# Patient Record
Sex: MALE | Race: WHITE | NOT HISPANIC OR LATINO | Employment: OTHER | URBAN - NONMETROPOLITAN AREA
[De-identification: names, ages, dates, MRNs, and addresses within clinical notes are randomized per-mention and may not be internally consistent; named-entity substitution may affect disease eponyms.]

---

## 2017-02-09 RX ORDER — MONTELUKAST SODIUM 10 MG/1
TABLET ORAL
Qty: 90 TABLET | Refills: 2 | Status: SHIPPED | OUTPATIENT
Start: 2017-02-09 | End: 2017-11-06 | Stop reason: SDUPTHER

## 2017-02-20 ENCOUNTER — OFFICE VISIT (OUTPATIENT)
Dept: UROLOGY | Facility: CLINIC | Age: 82
End: 2017-02-20

## 2017-02-20 VITALS
DIASTOLIC BLOOD PRESSURE: 74 MMHG | TEMPERATURE: 97.5 F | OXYGEN SATURATION: 94 % | HEART RATE: 70 BPM | BODY MASS INDEX: 19.6 KG/M2 | WEIGHT: 140 LBS | HEIGHT: 71 IN | SYSTOLIC BLOOD PRESSURE: 122 MMHG

## 2017-02-20 DIAGNOSIS — Z85.46 HISTORY OF PROSTATE CANCER: Primary | ICD-10-CM

## 2017-02-20 DIAGNOSIS — C61 MALIGNANT NEOPLASM OF PROSTATE (HCC): ICD-10-CM

## 2017-02-20 DIAGNOSIS — R97.20 ELEVATED PROSTATE SPECIFIC ANTIGEN (PSA): ICD-10-CM

## 2017-02-20 LAB
BILIRUB BLD-MCNC: NEGATIVE MG/DL
CLARITY, POC: CLEAR
COLOR UR: YELLOW
GLUCOSE UR STRIP-MCNC: NEGATIVE MG/DL
KETONES UR QL: NEGATIVE
LEUKOCYTE EST, POC: NEGATIVE
NITRITE UR-MCNC: NEGATIVE MG/ML
PH UR: 6 [PH] (ref 5–8)
PROT UR STRIP-MCNC: NEGATIVE MG/DL
RBC # UR STRIP: NEGATIVE /UL
SP GR UR: 1.01 (ref 1–1.03)
UROBILINOGEN UR QL: NORMAL

## 2017-02-20 PROCEDURE — 81003 URINALYSIS AUTO W/O SCOPE: CPT | Performed by: UROLOGY

## 2017-02-20 PROCEDURE — 99214 OFFICE O/P EST MOD 30 MIN: CPT | Performed by: UROLOGY

## 2017-02-20 NOTE — PROGRESS NOTES
Chief Complaint  Prostate Cancer (PATIENT HAS A HISTORY OF PROSTATE CANCER, RECENT PSA 2.8 )        BENITA Nielson is a 82 y.o. male who is referred for evaluation of his prostate with a past history of cancer and a recent documented rise in his PSA.  He reminds me that I diagnosed him 20 years ago with prostate cancer when his PSA was noted to be 13.  I referred him to Dr. Smith who recommended radiation therapy rather than surgery.  Subsequently his PSAs have been near 0 for years although since Dr. Arenas has been following him it has risen from 1-1/2-2.8 in 12 months.  Currently he denies any difficulty voiding throbbing AUA obstructive index of only 4.  His voided urine today is clear.    Vitals:    02/20/17 1500   BP: 122/74   Pulse: 70   Temp: 97.5 °F (36.4 °C)   SpO2: 94%       Past Medical History  Past Medical History   Diagnosis Date   • Allergic rhinitis    • Arthritis    • Asthma      AS CHILD   • Edema    • Foot drop, right foot    • Hypercholesterolemia    • Hypertension    • Laryngeal cancer      vocal cords   • Localized cancer of vocal cords    • Prostate cancer        Past Surgical History  Past Surgical History   Procedure Laterality Date   • Circumcision       done at 32 years old   • Cardiac catheterization       Dr. aMriusz Blanc at PAC 70% EF  mild to moderate left main disease,mild proximal LAD disease. Medical Therapy recommended   • Hernia repair       Inguinal Sliding- Dr. Negrete   • Prostate surgery        cancer w/ radiation   • Cataract extraction     • Eye surgery Right       tear in the right/Repair Of Retinal Detachment   • Throat surgery       vocal cord polyp removed 2x's  cancer w/ radiation  Dr. Olivares   • Tonsillectomy       With Adenoidectomy       Medications  has a current medication list which includes the following prescription(s): amlodipine, carvedilol, levofloxacin, mometasone, montelukast, and simvastatin.      Allergies  Allergies   Allergen Reactions   • Grass         Social History  Social History     Social History Narrative       Family History  He has no family history of bladder or kidney cancer  He has no family history of kidney stones      AUA Symptom Score:      Review of Systems  Review of Systems  He describes back pain but ROS is negative in all other categories.  Physical Exam  Physical Exam   Constitutional: He is oriented to person, place, and time. He appears well-developed and well-nourished.   HENT:   Head: Normocephalic and atraumatic.   Neck: Normal range of motion.   Pulmonary/Chest: Effort normal. No respiratory distress.   Abdominal: Soft. He exhibits no distension and no mass. There is no tenderness. No hernia. Hernia confirmed negative in the right inguinal area and confirmed negative in the left inguinal area.   Genitourinary: Rectum normal, prostate normal, testes normal and penis normal.   Musculoskeletal: Normal range of motion.   Lymphadenopathy: No inguinal adenopathy noted on the right or left side.   Neurological: He is alert and oriented to person, place, and time.   Skin: Skin is warm and dry.   Psychiatric: He has a normal mood and affect. His behavior is normal.   Vitals reviewed.      Labs Recent and today in the office:  Results for orders placed or performed in visit on 02/20/17   POC Urinalysis Dipstick, Automated   Result Value Ref Range    Color Yellow Yellow, Straw, Dark Yellow, Lidia    Clarity, UA Clear Clear    Glucose, UA Negative Negative, 1000 mg/dL (3+) mg/dL    Bilirubin Negative Negative    Ketones, UA Negative Negative    Specific Gravity  1.015 1.005 - 1.030    Blood, UA Negative Negative    pH, Urine 6.0 5.0 - 8.0    Protein, POC Negative Negative mg/dL    Urobilinogen, UA Normal Normal    Leukocytes Negative Negative    Nitrite, UA Negative Negative         Assessment & Plan  The significance of a rising PSA with a past history of prostate cancer is reviewed in detail with the patient.  Since he is asymptomatic I  suggested close surveillance and if his PSA begins an exponential rise or becomes symptomatic I would recommend androgen suppression.  He has already outlived his father and estimates his longevity at less than 5 years.

## 2017-02-27 RX ORDER — CARVEDILOL 25 MG/1
TABLET ORAL
Qty: 180 TABLET | Refills: 2 | Status: SHIPPED | OUTPATIENT
Start: 2017-02-27 | End: 2017-11-24 | Stop reason: SDUPTHER

## 2017-05-17 ENCOUNTER — LAB (OUTPATIENT)
Dept: UROLOGY | Facility: CLINIC | Age: 82
End: 2017-05-17

## 2017-05-17 DIAGNOSIS — R97.20 ELEVATED PROSTATE SPECIFIC ANTIGEN (PSA): Primary | ICD-10-CM

## 2017-05-17 LAB — PSA SERPL-MCNC: 2.81 NG/ML (ref 0.06–4)

## 2017-05-19 ENCOUNTER — OFFICE VISIT (OUTPATIENT)
Dept: UROLOGY | Facility: CLINIC | Age: 82
End: 2017-05-19

## 2017-05-19 VITALS
TEMPERATURE: 98 F | OXYGEN SATURATION: 97 % | SYSTOLIC BLOOD PRESSURE: 129 MMHG | DIASTOLIC BLOOD PRESSURE: 88 MMHG | HEART RATE: 72 BPM | RESPIRATION RATE: 16 BRPM

## 2017-05-19 DIAGNOSIS — Z85.46 HISTORY OF PROSTATE CANCER: Primary | ICD-10-CM

## 2017-05-19 DIAGNOSIS — C61 MALIGNANT NEOPLASM OF PROSTATE (HCC): ICD-10-CM

## 2017-05-19 LAB
BILIRUB BLD-MCNC: NEGATIVE MG/DL
CLARITY, POC: CLEAR
COLOR UR: YELLOW
GLUCOSE UR STRIP-MCNC: NEGATIVE MG/DL
KETONES UR QL: NEGATIVE
LEUKOCYTE EST, POC: NEGATIVE
NITRITE UR-MCNC: NEGATIVE MG/ML
PH UR: 6 [PH] (ref 5–8)
PROT UR STRIP-MCNC: NEGATIVE MG/DL
RBC # UR STRIP: NEGATIVE /UL
SP GR UR: 1.02 (ref 1–1.03)
UROBILINOGEN UR QL: NORMAL

## 2017-05-19 PROCEDURE — 81003 URINALYSIS AUTO W/O SCOPE: CPT | Performed by: UROLOGY

## 2017-05-19 PROCEDURE — 99213 OFFICE O/P EST LOW 20 MIN: CPT | Performed by: UROLOGY

## 2017-06-16 ENCOUNTER — OFFICE VISIT (OUTPATIENT)
Dept: INTERNAL MEDICINE | Facility: CLINIC | Age: 82
End: 2017-06-16

## 2017-06-16 VITALS
HEART RATE: 63 BPM | HEIGHT: 71 IN | DIASTOLIC BLOOD PRESSURE: 80 MMHG | OXYGEN SATURATION: 94 % | WEIGHT: 145.25 LBS | BODY MASS INDEX: 20.33 KG/M2 | TEMPERATURE: 98.6 F | SYSTOLIC BLOOD PRESSURE: 115 MMHG

## 2017-06-16 DIAGNOSIS — C32.9 LARYNGEAL CANCER (HCC): ICD-10-CM

## 2017-06-16 DIAGNOSIS — I10 ESSENTIAL HYPERTENSION: ICD-10-CM

## 2017-06-16 DIAGNOSIS — C61 MALIGNANT NEOPLASM OF PROSTATE (HCC): ICD-10-CM

## 2017-06-16 DIAGNOSIS — E78.2 MIXED HYPERLIPIDEMIA: Primary | ICD-10-CM

## 2017-06-16 DIAGNOSIS — M21.371 RIGHT FOOT DROP: ICD-10-CM

## 2017-06-16 PROCEDURE — 99397 PER PM REEVAL EST PAT 65+ YR: CPT | Performed by: INTERNAL MEDICINE

## 2017-06-16 PROCEDURE — G0439 PPPS, SUBSEQ VISIT: HCPCS | Performed by: INTERNAL MEDICINE

## 2017-06-16 NOTE — PATIENT INSTRUCTIONS
Medicare Wellness  Personal Prevention Plan of Service     Date of Office Visit:  2017  Encounter Provider:  Vinayak Arenas MD  Place of Service:  [facility]  Patient Name: Shane Nielson  :  1934    As part of the Medicare Wellness portion of your visit today, we are providing you with this personalized preventive plan of services (PPPS). This plan is based upon recommendations of the [place] Preventive Services Task Force (USPSTF) and the Advisory Committee on Immunization Practices (ACIP).    This lists the preventive care services that should be considered, and provides dates of when you are due. Items listed as completed are up-to-date and do not require any further intervention.    Health Maintenance   Topic Date Due   • TDAP/TD VACCINES (1 - Tdap) 1953   • PNEUMOCOCCAL VACCINES (65+ LOW/MEDIUM RISK) (2 of 2 - PPSV23) 2011   • LIPID PANEL  2016   • INFLUENZA VACCINE  2017   • MEDICARE ANNUAL WELLNESS  2018   • ZOSTER VACCINE  Completed       No orders of the defined types were placed in this encounter.      No Follow-up on file.

## 2017-06-16 NOTE — PROGRESS NOTES
QUICK REFERENCE INFORMATION:  The ABCs of the Annual Wellness Visit    Subsequent Medicare Wellness Visit    HEALTH RISK ASSESSMENT    1934    Recent Hospitalizations:  No hospitalization(s) within the last year..        Current Medical Providers:  Patient Care Team:  Vinayak Arenas MD as PCP - General  Shakeel Saavedra MD as Consulting Physician (Cardiology)        Smoking Status:  History   Smoking Status   • Former Smoker   • Quit date: 6/13/1981   Smokeless Tobacco   • Never Used       Alcohol Consumption:  History   Alcohol Use No       Depression Screen:   PHQ-9 Depression Screening 6/16/2017   Little interest or pleasure in doing things 0   Feeling down, depressed, or hopeless 0   PHQ-9 Total Score 0       Health Habits and Functional and Cognitive Screening:  Functional & Cognitive Status 6/16/2017   Do you have difficulty preparing food and eating? No   Do you have difficulty bathing yourself? No   Do you have difficulty getting dressed? No   Do you have difficulty using the toilet? No   Do you have difficulty moving around from place to place? Yes   In the past year have you fallen or experienced a near fall? No   Do you need help using the phone?  No   Are you deaf or do you have serious difficulty hearing?  No   Do you need help with transportation? No   Do you need help shopping? No   Do you need help preparing meals?  No   Do you need help with housework?  No   Do you need help with laundry? No   Do you need help taking your medications? No   Do you need help managing money? No   Do you have difficulty concentrating, remembering or making decisions? No       Health Habits  Current Diet: Well Balanced Diet  Dental Exam: Up to date  Eye Exam: Up to date  Exercise (times per week): 5 times per week  Current Exercise Activities Include: Walking      Does the patient have evidence of cognitive impairment? No    Aspirin use counseling: Does not need ASA (and currently is not on it)      Recent Lab  Results:  CMP:  Lab Results   Component Value Date    BUN 21 12/05/2016    CREATININE 1.10 12/05/2016    EGFRIFNONA 64 12/05/2016    BCR 19.1 12/05/2016     12/05/2016    K 4.7 12/05/2016    CO2 26.0 12/05/2016    CALCIUM 9.4 12/05/2016    ALBUMIN 4.40 12/05/2016    LABIL2 1.6 12/05/2016    BILITOT 1.0 12/05/2016    ALKPHOS 56 12/05/2016    AST 26 12/05/2016    ALT 21 12/05/2016     Lipid Panel:  Lab Results   Component Value Date    TRIG 78 08/15/2014    HDL 38 (L) 08/15/2014    LDLDIRECT 24 12/11/2015     HbA1c:       Visual Acuity:  No exam data present    Age-appropriate Screening Schedule:  Refer to the list below for future screening recommendations based on patient's age, sex and/or medical conditions. Orders for these recommended tests are listed in the plan section. The patient has been provided with a written plan.    Health Maintenance   Topic Date Due   • TDAP/TD VACCINES (1 - Tdap) 04/09/1953   • PNEUMOCOCCAL VACCINES (65+ LOW/MEDIUM RISK) (2 of 2 - PPSV23) 11/08/2011   • LIPID PANEL  12/11/2016   • INFLUENZA VACCINE  08/01/2017   • ZOSTER VACCINE  Completed        Subjective   History of Present Illness    Shane Nielson is a 83 y.o. male who presents for an Subsequent Wellness Visit.    The following portions of the patient's history were reviewed and updated as appropriate: allergies, current medications, past family history, past medical history, past social history, past surgical history and problem list.    Outpatient Medications Prior to Visit   Medication Sig Dispense Refill   • amLODIPine (NORVASC) 5 MG tablet TAKE 1 TABLET DAILY 90 tablet 3   • carvedilol (COREG) 25 MG tablet TAKE 1 TABLET TWICE A  tablet 2   • mometasone (NASONEX) 50 MCG/ACT nasal spray into each nostril.     • montelukast (SINGULAIR) 10 MG tablet TAKE 1 TABLET DAILY 90 tablet 2   • simvastatin (ZOCOR) 40 MG tablet TAKE 1 TABLET DAILY (Patient taking differently: TAKE 1/2 TABLET DAILY) 90 tablet 2   • levoFLOXacin  (LEVAQUIN) 500 MG tablet Take 1 tablet by mouth Daily. 7 tablet 0     No facility-administered medications prior to visit.        Patient Active Problem List   Diagnosis   • Mixed hyperlipidemia   • Essential hypertension   • Malignant neoplasm of prostate   • Coronary arteriosclerosis in native artery   • Arthritis   • Edema       Advance Care Planning:  has an advance directive - a copy HAS NOT been provided    Identification of Risk Factors:  Risk factors include: increased fall risk.    Review of Systems   Constitutional: Negative.  Negative for activity change, appetite change, fatigue and fever.   HENT: Positive for congestion, postnasal drip and rhinorrhea. Negative for ear discharge, ear pain and trouble swallowing.    Eyes: Negative for photophobia and visual disturbance.   Respiratory: Negative for cough and shortness of breath.    Cardiovascular: Negative for chest pain and palpitations.   Gastrointestinal: Negative for abdominal distention, abdominal pain, constipation, diarrhea, nausea and vomiting.   Endocrine: Negative.    Genitourinary: Negative for dysuria, hematuria and urgency.        Nocturia   Musculoskeletal: Positive for arthralgias and gait problem. Negative for back pain, joint swelling and myalgias.   Skin: Negative for color change and rash.   Allergic/Immunologic: Negative.    Neurological: Negative for dizziness, weakness, light-headedness and headaches.        Rt foot drop   Hematological: Negative for adenopathy. Does not bruise/bleed easily.   Psychiatric/Behavioral: Negative for agitation, confusion and dysphoric mood. The patient is not nervous/anxious.        Compared to one year ago, the patient feels his physical health is the same.  Compared to one year ago, the patient feels his mental health is the same.    Objective     Physical Exam   Constitutional: He is oriented to person, place, and time. He appears well-developed and well-nourished. No distress.   HENT:   Nose: Nose  "normal.   Mouth/Throat: Oropharynx is clear and moist.   Eyes: Conjunctivae and EOM are normal. No scleral icterus.   Neck: No tracheal deviation present. No thyromegaly present.   Cardiovascular: Normal rate and regular rhythm.  Exam reveals no friction rub.    No murmur heard.  Pulmonary/Chest: No respiratory distress. He has no wheezes. He has no rales.   Abdominal: Soft. He exhibits no distension and no mass. There is no tenderness. There is no guarding.   Musculoskeletal: Normal range of motion. He exhibits no deformity.   Lymphadenopathy:     He has no cervical adenopathy.   Neurological: He is alert and oriented to person, place, and time. He has normal reflexes. No cranial nerve deficit. Coordination normal.   Rt foot drop   Skin: Skin is warm and dry. No rash noted. No erythema.   Psychiatric: He has a normal mood and affect. His behavior is normal. Judgment and thought content normal.       Vitals:    06/16/17 1033   BP: 115/80   Pulse: 63   Temp: 98.6 °F (37 °C)   SpO2: 94%   Weight: 145 lb 4 oz (65.9 kg)   Height: 71\" (180.3 cm)       Body mass index is 20.26 kg/(m^2).  Discussed the patient's BMI with him. The BMI is in the acceptable range.    Assessment/Plan   Patient Self-Management and Personalized Health Advice  The patient has been provided with information about: fall prevention and preventive services including:   · Fall Risk assessment done, Fall Risk plan of care done.    Visit Diagnoses:    ICD-10-CM ICD-9-CM   1. Mixed hyperlipidemia. Stable on statins. Check lipids with next visit E78.2 272.2   2. Essential hypertension. Stable with current meds I10 401.9   3. Malignant neoplasm of prostate. No sig. Symptoms. psa stable C61 185   4. Right foot drop with brace . Uses cane  M21.371 736.79   5. Laryngeal cancer. Stable follows with ent C32.9 161.9       No orders of the defined types were placed in this encounter.      Outpatient Encounter Prescriptions as of 6/16/2017   Medication Sig " Dispense Refill   • amLODIPine (NORVASC) 5 MG tablet TAKE 1 TABLET DAILY 90 tablet 3   • carvedilol (COREG) 25 MG tablet TAKE 1 TABLET TWICE A  tablet 2   • mometasone (NASONEX) 50 MCG/ACT nasal spray into each nostril.     • montelukast (SINGULAIR) 10 MG tablet TAKE 1 TABLET DAILY 90 tablet 2   • simvastatin (ZOCOR) 40 MG tablet TAKE 1 TABLET DAILY (Patient taking differently: TAKE 1/2 TABLET DAILY) 90 tablet 2   • [DISCONTINUED] levoFLOXacin (LEVAQUIN) 500 MG tablet Take 1 tablet by mouth Daily. 7 tablet 0     No facility-administered encounter medications on file as of 6/16/2017.        Reviewed use of high risk medication in the elderly: yes  Reviewed for potential of harmful drug interactions in the elderly: yes    Follow Up:  No Follow-up on file.     An After Visit Summary and PPPS with all of these plans were given to the patient.

## 2017-06-25 ENCOUNTER — APPOINTMENT (OUTPATIENT)
Dept: GENERAL RADIOLOGY | Facility: HOSPITAL | Age: 82
End: 2017-06-25

## 2017-06-25 ENCOUNTER — HOSPITAL ENCOUNTER (EMERGENCY)
Facility: HOSPITAL | Age: 82
Discharge: HOME | End: 2017-06-25
Attending: EMERGENCY MEDICINE | Admitting: EMERGENCY MEDICINE

## 2017-06-25 VITALS
SYSTOLIC BLOOD PRESSURE: 110 MMHG | HEIGHT: 71 IN | DIASTOLIC BLOOD PRESSURE: 68 MMHG | HEART RATE: 65 BPM | TEMPERATURE: 97.8 F | RESPIRATION RATE: 17 BRPM | WEIGHT: 145 LBS | BODY MASS INDEX: 20.3 KG/M2 | OXYGEN SATURATION: 97 %

## 2017-06-25 DIAGNOSIS — M79.671 RIGHT FOOT PAIN: Primary | ICD-10-CM

## 2017-06-25 PROCEDURE — 73630 X-RAY EXAM OF FOOT: CPT

## 2017-06-25 PROCEDURE — 99284 EMERGENCY DEPT VISIT MOD MDM: CPT

## 2017-06-25 RX ORDER — HYDROCODONE BITARTRATE AND ACETAMINOPHEN 5; 325 MG/1; MG/1
1 TABLET ORAL ONCE
Status: COMPLETED | OUTPATIENT
Start: 2017-06-25 | End: 2017-06-25

## 2017-06-25 RX ADMIN — HYDROCODONE BITARTRATE AND ACETAMINOPHEN 1 TABLET: 5; 325 TABLET ORAL at 10:11

## 2017-06-25 NOTE — DISCHARGE INSTRUCTIONS
You may take Tylenol 650mg every 6-8 hours as well as Aleve 500mg every 12 hours as needed for pain or fever.

## 2017-06-25 NOTE — ED PROVIDER NOTES
Subjective   History of Present Illness  83-year-old male with a right-sided foot drop secondary to L2-L3 compression fracture presenting with right foot pain.  Patient states that he took his legs often had pain on the dorsal surface of the right foot at the MTP of the second toe.  Denies any trauma that he is aware of, swelling, redness, or other specific symptoms.  Sensation is limited in this foot.    Review of Systems   Musculoskeletal: Positive for arthralgias.   All other systems reviewed and are negative.      Past Medical History:   Diagnosis Date   • Allergic rhinitis    • Arthritis    • Asthma     AS CHILD   • Edema    • Foot drop, right foot    • Hypercholesterolemia    • Hypertension    • Laryngeal cancer     vocal cords   • Localized cancer of vocal cords    • Prostate cancer        Allergies   Allergen Reactions   • Grass        Past Surgical History:   Procedure Laterality Date   • CARDIAC CATHETERIZATION      Dr. Mariusz Blanc at PAC 70% EF  mild to moderate left main disease,mild proximal LAD disease. Medical Therapy recommended   • CATARACT EXTRACTION     • CIRCUMCISION      done at 32 years old   • EYE SURGERY Right      tear in the right/Repair Of Retinal Detachment   • HERNIA REPAIR      Inguinal Sliding- Dr. Negrete   • PROSTATE SURGERY       cancer w/ radiation   • THROAT SURGERY      vocal cord polyp removed 2x's  cancer w/ radiation  Dr. Olivares   • TONSILLECTOMY      With Adenoidectomy       Family History   Problem Relation Age of Onset   • Stroke Mother    • Cancer Father        Social History     Social History   • Marital status:      Spouse name: N/A   • Number of children: N/A   • Years of education: N/A     Social History Main Topics   • Smoking status: Former Smoker     Quit date: 6/13/1981   • Smokeless tobacco: Never Used   • Alcohol use No   • Drug use: No   • Sexual activity: Not Asked     Other Topics Concern   • None     Social History Narrative           Objective    Physical Exam   Constitutional: He is oriented to person, place, and time. He appears well-developed and well-nourished. No distress.   Cardiovascular: Normal rate and intact distal pulses.    Pulmonary/Chest: Effort normal. No respiratory distress.   Musculoskeletal: Normal range of motion. He exhibits tenderness (Minimal tenderness palpation of the MTP of the right second toe.). He exhibits no edema or deformity.   Neurological: He is alert and oriented to person, place, and time.   Patient has no strength in the right foot throughout.  States he has some sensation to light touch but not much.   Skin: Skin is warm and dry. No rash noted. He is not diaphoretic. No erythema. No pallor.   Psychiatric: He has a normal mood and affect. His behavior is normal.   Nursing note and vitals reviewed.      Procedures         ED Course  ED Course                  MDM   83-year-old male here with right foot pain.  Afebrile, vital signs stable.  No injury that he remembers and no evidence of injury on exam.  Minimal tenderness to palpation.  No swelling, deformity, break in skin.  Sensation and strength are both limited at baseline so this exam is also limited.  He appears to have a warm, well-perfused foot so.  We'll get an x-ray to evaluate for fracture, give pain control and reassess.    10:50 AM x-ray show no evidence of fracture.  We'll give regulation for Tylenol and Aleve and discharge home with regulation follow-up with his orthopedic surgeon for further management.    Final diagnoses:   Right foot pain            Wero Celis MD  06/25/17 2888

## 2017-08-01 RX ORDER — SIMVASTATIN 40 MG
TABLET ORAL
Qty: 90 TABLET | Refills: 3 | Status: SHIPPED | OUTPATIENT
Start: 2017-08-01 | End: 2018-10-27 | Stop reason: SDUPTHER

## 2017-08-24 ENCOUNTER — OFFICE VISIT (OUTPATIENT)
Dept: CARDIOLOGY | Facility: CLINIC | Age: 82
End: 2017-08-24

## 2017-08-24 VITALS
HEIGHT: 71 IN | HEART RATE: 60 BPM | BODY MASS INDEX: 19.8 KG/M2 | WEIGHT: 141.4 LBS | SYSTOLIC BLOOD PRESSURE: 120 MMHG | DIASTOLIC BLOOD PRESSURE: 60 MMHG

## 2017-08-24 DIAGNOSIS — I25.10 CORONARY ARTERIOSCLEROSIS IN NATIVE ARTERY: Primary | ICD-10-CM

## 2017-08-24 DIAGNOSIS — I10 ESSENTIAL HYPERTENSION: ICD-10-CM

## 2017-08-24 DIAGNOSIS — E78.2 MIXED HYPERLIPIDEMIA: ICD-10-CM

## 2017-08-24 PROCEDURE — 99214 OFFICE O/P EST MOD 30 MIN: CPT | Performed by: INTERNAL MEDICINE

## 2017-08-24 NOTE — PROGRESS NOTES
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 CAD  1. 1999 C nonobst  2. HTN  3. HL   1. 2016 LDL 24  4. 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 XRT 2000  6. R foot drop- brace ([place])  7. Prostate CA XRT ([place])    Allergies  Allergies   Allergen Reactions   • Grass        Current Medications    Current Outpatient Prescriptions:   •  amLODIPine (NORVASC) 5 MG tablet, TAKE 1 TABLET DAILY, Disp: 90 tablet, Rfl: 3  •  carvedilol (COREG) 25 MG tablet, TAKE 1 TABLET TWICE A DAY, Disp: 180 tablet, Rfl: 2  •  mometasone (NASONEX) 50 MCG/ACT nasal spray, into each nostril., Disp: , Rfl:   •  montelukast (SINGULAIR) 10 MG tablet, TAKE 1 TABLET DAILY, Disp: 90 tablet, Rfl: 2  •  simvastatin (ZOCOR) 40 MG tablet, TAKE 1 TABLET DAILY, Disp: 90 tablet, Rfl: 3      History of Present Illness   Pt here for follow up, no new cardiac complaints. Follows with Dr. Arenas regularly for labs. HLD stable. HTN stable. Walks for exercise. Hurt his R foot 2 months ago and the pains resolved with short term ibuprofen.   Pt denies any chest pain, dyspnea, dyspnea on exertion, orthopnea, PND, palpitations, lower extremity edema, or claudication.    ROS:  All systems have been reviewed and are negative with the exception of those mentioned in the HPI.    OBJECTIVE:  Vitals:    08/24/17 0903   BP: 120/60   BP Location: Left arm   Patient Position: Sitting   Pulse: 60   Weight: 141 lb 6.4 oz (64.1 kg)   Height: 71\" (180.3 cm)     Physical Exam   Constitutional: He is oriented to person, place, and time. He appears well-developed and well-nourished. No distress.   Cardiovascular: Normal rate, regular rhythm, normal " heart sounds and intact distal pulses.    No murmur heard.  Pulses:       Radial pulses are 2+ on the right side, and 2+ on the left side.        Dorsalis pedis pulses are 2+ on the right side, and 2+ on the left side.        Posterior tibial pulses are 2+ on the right side, and 2+ on the left side.   Pulmonary/Chest: Effort normal and breath sounds normal. He has no wheezes. He has no rales.   Abdominal: Soft. Bowel sounds are normal. There is no tenderness. There is no guarding.   Musculoskeletal: He exhibits no edema or tenderness.   R foot in brace   Neurological: He is alert and oriented to person, place, and time.   Skin: Skin is warm and dry. No rash noted.   Psychiatric: He has a normal mood and affect.   Nursing note and vitals reviewed.      Diagnostic Data:  Procedures      ASSESSMENT:   Diagnosis Plan   1. Coronary arteriosclerosis in native artery     2. Mixed hyperlipidemia     3. Essential hypertension         PLAN:  1. No anginal equiv, continue medical management  2. Well controlled on low dose simvastatin  3. Well controlled with lwo nl BP, no symptomatic hypotension. Continue current tx and consider decreasing amlodipine if any hypotensive symptoms.     RTC 1 year, sooner if needed.    Vinayak Arenas MD, thank you for referring Mr. Nielson for evaluation.  I have forwarded my electronically generated recommendations to you for review.  Please do not hesitate to call with any questions.    Scribed for Shakeel Saavedra MD by Tatyana Hassan PA-C. 8/24/2017  9:16 AM  I, Shakeel Saavedra MD, personally performed the services described in this documentation as scribed by the above named individual in my presence, and it is both accurate and complete.  8/24/2017  9:59 AM    Shakeel Saavedra MD, FACC

## 2017-11-06 RX ORDER — MONTELUKAST SODIUM 10 MG/1
TABLET ORAL
Qty: 90 TABLET | Refills: 3 | Status: SHIPPED | OUTPATIENT
Start: 2017-11-06 | End: 2018-11-01 | Stop reason: SDUPTHER

## 2017-11-21 ENCOUNTER — LAB (OUTPATIENT)
Dept: UROLOGY | Facility: CLINIC | Age: 82
End: 2017-11-21

## 2017-11-21 DIAGNOSIS — Z85.46 PERSONAL HISTORY OF PROSTATE CANCER: Primary | ICD-10-CM

## 2017-11-21 LAB — PSA SERPL-MCNC: 2.67 NG/ML (ref 0.06–4)

## 2017-11-27 ENCOUNTER — OFFICE VISIT (OUTPATIENT)
Dept: UROLOGY | Facility: CLINIC | Age: 82
End: 2017-11-27

## 2017-11-27 VITALS
SYSTOLIC BLOOD PRESSURE: 120 MMHG | HEART RATE: 76 BPM | BODY MASS INDEX: 19.74 KG/M2 | TEMPERATURE: 97.8 F | OXYGEN SATURATION: 96 % | DIASTOLIC BLOOD PRESSURE: 60 MMHG | HEIGHT: 71 IN | WEIGHT: 141 LBS

## 2017-11-27 DIAGNOSIS — Z85.46 PERSONAL HISTORY OF PROSTATE CANCER: Primary | ICD-10-CM

## 2017-11-27 LAB
BILIRUB BLD-MCNC: NEGATIVE MG/DL
CLARITY, POC: CLEAR
COLOR UR: YELLOW
GLUCOSE UR STRIP-MCNC: NEGATIVE MG/DL
KETONES UR QL: NEGATIVE
LEUKOCYTE EST, POC: NEGATIVE
NITRITE UR-MCNC: NEGATIVE MG/ML
PH UR: 6 [PH] (ref 5–8)
PROT UR STRIP-MCNC: ABNORMAL MG/DL
RBC # UR STRIP: NEGATIVE /UL
SP GR UR: 1.02 (ref 1–1.03)
UROBILINOGEN UR QL: ABNORMAL

## 2017-11-27 PROCEDURE — 99213 OFFICE O/P EST LOW 20 MIN: CPT | Performed by: UROLOGY

## 2017-11-27 PROCEDURE — 81003 URINALYSIS AUTO W/O SCOPE: CPT | Performed by: UROLOGY

## 2017-11-27 RX ORDER — CARVEDILOL 25 MG/1
TABLET ORAL
Qty: 180 TABLET | Refills: 3 | Status: SHIPPED | OUTPATIENT
Start: 2017-11-27 | End: 2018-06-22 | Stop reason: SDUPTHER

## 2017-11-27 NOTE — PROGRESS NOTES
Chief Complaint  Hx of prostate cancer (6 month follow-up)        BENITA Nielson is a 83 y.o. male who returns today for follow-up with a past history of prostate cancer treated years ago with radiation therapy and more recently with a slight rise in his PSA.  Fortunately on the last 3 visits is been very steady at 2.8 or actually less today.  He remains asymptomatic and has a benign digital rectal exam.    Vitals:    11/27/17 0903   BP: 120/60   Pulse: 76   Temp: 97.8 °F (36.6 °C)   SpO2: 96%       Past Medical History  Past Medical History:   Diagnosis Date   • Allergic rhinitis    • Arthritis    • Asthma     AS CHILD   • Edema    • Foot drop, right foot    • Hypercholesterolemia    • Hypertension    • Laryngeal cancer     vocal cords   • Localized cancer of vocal cords    • Prostate cancer        Past Surgical History  Past Surgical History:   Procedure Laterality Date   • CARDIAC CATHETERIZATION      Dr. Mariusz Blanc at [facility] 70% EF  mild to moderate left main disease,mild proximal LAD disease. Medical Therapy recommended   • CATARACT EXTRACTION     • CIRCUMCISION      done at 32 years old   • EYE SURGERY Right      tear in the right/Repair Of Retinal Detachment   • HERNIA REPAIR      Inguinal Sliding- Dr. Negrete   • PROSTATE SURGERY       cancer w/ radiation   • THROAT SURGERY      vocal cord polyp removed 2x's  cancer w/ radiation  Dr. Olivares   • TONSILLECTOMY      With Adenoidectomy       Medications  has a current medication list which includes the following prescription(s): amlodipine, carvedilol, mometasone, montelukast, and simvastatin.      Allergies  Allergies   Allergen Reactions   • Grass        Social History  Social History     Social History Narrative       Family History  He has no family history of bladder or kidney cancer  He has no family history of kidney stones      AUA Symptom Score:      Review of Systems  Review of Systems   Constitutional: Negative.    Genitourinary: Negative.    All other  systems reviewed and are negative.      Physical Exam  Physical Exam    Labs Recent and today in the office:  Results for orders placed or performed in visit on 11/27/17   POC Urinalysis Dipstick, Automated   Result Value Ref Range    Color Yellow Yellow, Straw, Dark Yellow, Lidia    Clarity, UA Clear Clear    Glucose, UA Negative Negative, 1000 mg/dL (3+) mg/dL    Bilirubin Negative Negative    Ketones, UA Negative Negative    Specific Gravity  1.020 1.005 - 1.030    Blood, UA Negative Negative    pH, Urine 6.0 5.0 - 8.0    Protein, POC Trace (A) Negative mg/dL    Urobilinogen, UA 1 E.U./dL  (A) Normal    Leukocytes Negative Negative    Nitrite, UA Negative Negative         Assessment & Plan  History of prostate cancer: With an actual decline in his PSA I think he can return on an annual basis.

## 2017-12-07 RX ORDER — AMLODIPINE BESYLATE 5 MG/1
TABLET ORAL
Qty: 90 TABLET | Refills: 3 | Status: SHIPPED | OUTPATIENT
Start: 2017-12-07 | End: 2018-12-02 | Stop reason: SDUPTHER

## 2017-12-14 ENCOUNTER — OFFICE VISIT (OUTPATIENT)
Dept: INTERNAL MEDICINE | Facility: CLINIC | Age: 82
End: 2017-12-14

## 2017-12-14 VITALS
HEIGHT: 71 IN | TEMPERATURE: 97.3 F | SYSTOLIC BLOOD PRESSURE: 110 MMHG | DIASTOLIC BLOOD PRESSURE: 70 MMHG | OXYGEN SATURATION: 98 % | HEART RATE: 56 BPM | BODY MASS INDEX: 18.34 KG/M2 | WEIGHT: 131 LBS

## 2017-12-14 DIAGNOSIS — C61 MALIGNANT NEOPLASM OF PROSTATE (HCC): ICD-10-CM

## 2017-12-14 DIAGNOSIS — I25.10 CORONARY ARTERIOSCLEROSIS IN NATIVE ARTERY: ICD-10-CM

## 2017-12-14 DIAGNOSIS — I10 ESSENTIAL HYPERTENSION: ICD-10-CM

## 2017-12-14 DIAGNOSIS — M21.371 RIGHT FOOT DROP: Primary | ICD-10-CM

## 2017-12-14 DIAGNOSIS — E78.2 MIXED HYPERLIPIDEMIA: ICD-10-CM

## 2017-12-14 PROCEDURE — 99214 OFFICE O/P EST MOD 30 MIN: CPT | Performed by: INTERNAL MEDICINE

## 2017-12-14 RX ORDER — ACYCLOVIR 50 MG/G
OINTMENT TOPICAL
COMMUNITY
Start: 2017-11-08 | End: 2018-03-26

## 2017-12-14 NOTE — PROGRESS NOTES
"Pernell Nielson is a 83 y.o. male    HPI coming in for follow-up patient with a history of prostate CA and hypertension hyperlipidemia. No new complaints denies chest pain or shortness of breath. No alcohol or tobacco use    The following portions of the patient's history were reviewed and updated as appropriate: allergies, current medications, past family history, past medical history, past social history, past surgical history, and problem list.     Review of Systems   Constitutional: Negative.  Negative for activity change, appetite change, fatigue and fever.   HENT: Positive for congestion, postnasal drip and rhinorrhea. Negative for ear discharge, ear pain and trouble swallowing.    Eyes: Negative for photophobia and visual disturbance.   Respiratory: Negative for cough and shortness of breath.    Cardiovascular: Negative for chest pain and palpitations.   Gastrointestinal: Negative for abdominal distention, abdominal pain, constipation, diarrhea, nausea and vomiting.   Endocrine: Negative.    Genitourinary: Negative for dysuria, hematuria and urgency.        Nocturia   Musculoskeletal: Positive for arthralgias and gait problem. Negative for back pain, joint swelling and myalgias.   Skin: Negative for color change and rash.   Allergic/Immunologic: Negative.    Neurological: Negative for dizziness, weakness, light-headedness and headaches.        Rt foot drop   Hematological: Negative for adenopathy. Does not bruise/bleed easily.   Psychiatric/Behavioral: Negative for agitation, confusion and dysphoric mood. The patient is not nervous/anxious.        Visit Vitals   • /70   • Pulse 56   • Temp 97.3 °F (36.3 °C)   • Ht 180.3 cm (71\")   • Wt 59.4 kg (131 lb)   • SpO2 98%   • BMI 18.27 kg/m2       Objective  Physical Exam   Constitutional: He is oriented to person, place, and time. He appears well-developed and well-nourished. No distress.   HENT:   Nose: Nose normal.   Mouth/Throat: Oropharynx is " clear and moist.   Eyes: Conjunctivae and EOM are normal. No scleral icterus.   Neck: No tracheal deviation present. No thyromegaly present.   Cardiovascular: Normal rate and regular rhythm.  Exam reveals no friction rub.    No murmur heard.  Pulmonary/Chest: No respiratory distress. He has no wheezes. He has no rales.   Abdominal: Soft. He exhibits no distension and no mass. There is no tenderness. There is no guarding.   Musculoskeletal: Normal range of motion. He exhibits no deformity.   Lymphadenopathy:     He has no cervical adenopathy.   Neurological: He is alert and oriented to person, place, and time. He has normal reflexes. No cranial nerve deficit. Coordination normal.   Rt foot drop   Skin: Skin is warm and dry. No rash noted. No erythema.   Psychiatric: He has a normal mood and affect. His behavior is normal. Judgment and thought content normal.       Diagnoses and all orders for this visit:    Right foot drop from severe low back radiculopathy. stable has a splint. No pain    Coronary arteriosclerosis in native artery. Stable angina free    Essential hypertension. Continue with current meds low-salt diet    Mixed hyperlipidemia. Continue with the statins follow lipid profile    Malignant neoplasm of prostate    Other orders  -     acyclovir (ZOVIRAX) 5 % ointment;

## 2018-03-16 ENCOUNTER — APPOINTMENT (OUTPATIENT)
Dept: GENERAL RADIOLOGY | Facility: HOSPITAL | Age: 83
End: 2018-03-16

## 2018-03-16 ENCOUNTER — HOSPITAL ENCOUNTER (EMERGENCY)
Facility: HOSPITAL | Age: 83
Discharge: HOME | End: 2018-03-16
Attending: EMERGENCY MEDICINE | Admitting: EMERGENCY MEDICINE

## 2018-03-16 ENCOUNTER — TELEPHONE (OUTPATIENT)
Dept: INTERNAL MEDICINE | Facility: CLINIC | Age: 83
End: 2018-03-16

## 2018-03-16 VITALS
BODY MASS INDEX: 19.6 KG/M2 | WEIGHT: 140 LBS | TEMPERATURE: 97.5 F | SYSTOLIC BLOOD PRESSURE: 136 MMHG | OXYGEN SATURATION: 97 % | DIASTOLIC BLOOD PRESSURE: 62 MMHG | RESPIRATION RATE: 16 BRPM | HEIGHT: 71 IN | HEART RATE: 60 BPM

## 2018-03-16 VITALS
RESPIRATION RATE: 18 BRPM | BODY MASS INDEX: 19.6 KG/M2 | DIASTOLIC BLOOD PRESSURE: 79 MMHG | HEIGHT: 71 IN | SYSTOLIC BLOOD PRESSURE: 151 MMHG | HEART RATE: 66 BPM | WEIGHT: 140 LBS | OXYGEN SATURATION: 98 % | TEMPERATURE: 98.3 F

## 2018-03-16 DIAGNOSIS — M54.16 LUMBAR RADICULOPATHY: Primary | ICD-10-CM

## 2018-03-16 DIAGNOSIS — M79.671 FOOT PAIN, RIGHT: Primary | ICD-10-CM

## 2018-03-16 LAB
BASOPHILS # BLD AUTO: 0.04 10*3/MM3 (ref 0–0.2)
BASOPHILS NFR BLD AUTO: 0.7 % (ref 0–2.5)
DEPRECATED RDW RBC AUTO: 44.9 FL (ref 37–54)
EOSINOPHIL # BLD AUTO: 0.43 10*3/MM3 (ref 0–0.7)
EOSINOPHIL NFR BLD AUTO: 7.9 % (ref 0–7)
ERYTHROCYTE [DISTWIDTH] IN BLOOD BY AUTOMATED COUNT: 12.7 % (ref 11.5–14.5)
HCT VFR BLD AUTO: 32.8 % (ref 42–52)
HGB BLD-MCNC: 11.3 G/DL (ref 14–18)
IMM GRANULOCYTES # BLD: 0.01 10*3/MM3 (ref 0–0.06)
IMM GRANULOCYTES NFR BLD: 0.2 % (ref 0–0.6)
LYMPHOCYTES # BLD AUTO: 1.06 10*3/MM3 (ref 0.6–3.4)
LYMPHOCYTES NFR BLD AUTO: 19.6 % (ref 10–50)
MCH RBC QN AUTO: 33.1 PG (ref 27–31)
MCHC RBC AUTO-ENTMCNC: 34.5 G/DL (ref 30–37)
MCV RBC AUTO: 96.2 FL (ref 80–94)
MONOCYTES # BLD AUTO: 0.73 10*3/MM3 (ref 0–0.9)
MONOCYTES NFR BLD AUTO: 13.5 % (ref 0–12)
NEUTROPHILS # BLD AUTO: 3.15 10*3/MM3 (ref 2–6.9)
NEUTROPHILS NFR BLD AUTO: 58.1 % (ref 37–80)
NRBC BLD MANUAL-RTO: 0 /100 WBC (ref 0–0)
PLATELET # BLD AUTO: 218 10*3/MM3 (ref 130–400)
PMV BLD AUTO: 9.6 FL (ref 6–12)
RBC # BLD AUTO: 3.41 10*6/MM3 (ref 4.7–6.1)
WBC NRBC COR # BLD: 5.42 10*3/MM3 (ref 4.8–10.8)

## 2018-03-16 PROCEDURE — 99283 EMERGENCY DEPT VISIT LOW MDM: CPT

## 2018-03-16 PROCEDURE — 73630 X-RAY EXAM OF FOOT: CPT

## 2018-03-16 PROCEDURE — 36415 COLL VENOUS BLD VENIPUNCTURE: CPT

## 2018-03-16 PROCEDURE — 85025 COMPLETE CBC W/AUTO DIFF WBC: CPT | Performed by: EMERGENCY MEDICINE

## 2018-03-16 RX ORDER — HYDROCODONE BITARTRATE AND ACETAMINOPHEN 5; 325 MG/1; MG/1
1 TABLET ORAL ONCE
Status: COMPLETED | OUTPATIENT
Start: 2018-03-16 | End: 2018-03-16

## 2018-03-16 RX ORDER — DICLOFENAC SODIUM 75 MG/1
75 TABLET, DELAYED RELEASE ORAL 2 TIMES DAILY
Qty: 20 TABLET | Refills: 0 | Status: SHIPPED | OUTPATIENT
Start: 2018-03-16 | End: 2018-03-26

## 2018-03-16 RX ORDER — HYDROCODONE BITARTRATE AND ACETAMINOPHEN 5; 325 MG/1; MG/1
1 TABLET ORAL EVERY 4 HOURS PRN
Qty: 10 TABLET | Refills: 0 | Status: SHIPPED | OUTPATIENT
Start: 2018-03-16 | End: 2018-03-26

## 2018-03-16 RX ADMIN — HYDROCODONE BITARTRATE AND ACETAMINOPHEN 1 TABLET: 5; 325 TABLET ORAL at 14:28

## 2018-03-16 NOTE — ED PROVIDER NOTES
Subjective   Patient states he awoke with pain in his right foot.  This happened once before the pain is at the base of his fourth toe.  Took some ibuprofen didn't seem to do much.  The pain is improved upon arrival to the ED denies injury redness swelling        History provided by:  Patient and spouse   used: No    Lower Extremity Issue   Location:  Foot  Time since incident:  3 hours  Injury: no    Foot location:  R foot  Pain details:     Quality:  Aching    Radiates to:  Does not radiate    Severity:  Mild    Onset quality:  Sudden    Timing:  Constant    Progression:  Partially resolved  Chronicity:  New  Dislocation: no    Foreign body present:  No foreign bodies  Prior injury to area:  No  Relieved by:  NSAIDs  Worsened by:  Nothing  Associated symptoms: no fever, no swelling and no tingling        Review of Systems   Constitutional: Negative.  Negative for fever.   HENT: Negative.    Respiratory: Negative.    Cardiovascular: Negative.  Negative for chest pain.   Gastrointestinal: Negative.  Negative for abdominal pain.   Endocrine: Negative.    Genitourinary: Negative.  Negative for dysuria.   Skin: Negative.    Neurological: Negative.    Psychiatric/Behavioral: Negative.    All other systems reviewed and are negative.      Past Medical History:   Diagnosis Date   • Allergic rhinitis    • Arthritis    • Asthma     AS CHILD   • Edema    • Foot drop, right foot    • Hypercholesterolemia    • Hypertension    • Laryngeal cancer     vocal cords   • Localized cancer of vocal cords    • Prostate cancer        Allergies   Allergen Reactions   • Grass        Past Surgical History:   Procedure Laterality Date   • CARDIAC CATHETERIZATION      Dr. Mariusz Blanc at PAC 70% EF  mild to moderate left main disease,mild proximal LAD disease. Medical Therapy recommended   • CATARACT EXTRACTION     • CIRCUMCISION      done at 32 years old   • EYE SURGERY Right      tear in the right/Repair Of Retinal  Detachment   • HERNIA REPAIR      Inguinal Sliding- Dr. Negrete   • PROSTATE SURGERY       cancer w/ radiation   • THROAT SURGERY      vocal cord polyp removed 2x's  cancer w/ radiation  Dr. Olivares   • TONSILLECTOMY      With Adenoidectomy       Family History   Problem Relation Age of Onset   • Stroke Mother    • Cancer Father        Social History     Social History   • Marital status:      Social History Main Topics   • Smoking status: Former Smoker     Quit date: 6/13/1981   • Smokeless tobacco: Never Used   • Alcohol use No   • Drug use: No     Other Topics Concern   • Not on file           Objective   Physical Exam   Constitutional: He is oriented to person, place, and time. He appears well-developed and well-nourished. No distress.   HENT:   Head: Normocephalic and atraumatic.   Right Ear: External ear normal.   Left Ear: External ear normal.   Nose: Nose normal.   Eyes: Conjunctivae and EOM are normal. Pupils are equal, round, and reactive to light.   Neck: Normal range of motion. Neck supple. No JVD present. No tracheal deviation present.   Cardiovascular: Normal rate, regular rhythm and normal heart sounds.    No murmur heard.  Pulmonary/Chest: Effort normal and breath sounds normal. No respiratory distress. He has no wheezes.   Abdominal: Soft. Bowel sounds are normal. There is no tenderness.   Musculoskeletal: Normal range of motion. He exhibits no edema or deformity.        Neurological: He is alert and oriented to person, place, and time. No cranial nerve deficit.   Skin: Skin is warm and dry. No rash noted. He is not diaphoretic. No erythema. No pallor.   Psychiatric: He has a normal mood and affect. His behavior is normal. Thought content normal.   Nursing note and vitals reviewed.      Procedures        XR Foot 3+ View Right   ED Interpretation   Negative          ED Course  ED Course      Patient has remained stable throughout his ED course            MDM    Final diagnoses:   Foot pain, right             Raul Bess MD  03/16/18 4333

## 2018-03-16 NOTE — ED PROVIDER NOTES
Subjective   83-year-old male with foot pain.  Seen in emergency department earlier this morning and was given anti-inflammatories after negative x-rays.  He has radiculopathy and worse a brace on the right foot, he has nerve pain but his normal medication is not helping.  The pain is on the top of his foot between his third and fourth toe.  It is a sharp intermittent pain.        History provided by:  Patient   used: No        Review of Systems   Musculoskeletal:        Right foot pain   All other systems reviewed and are negative.      Past Medical History:   Diagnosis Date   • Allergic rhinitis    • Arthritis    • Asthma     AS CHILD   • Edema    • Foot drop, right foot    • Hypercholesterolemia    • Hypertension    • Laryngeal cancer     vocal cords   • Localized cancer of vocal cords    • Prostate cancer        Allergies   Allergen Reactions   • Grass        Past Surgical History:   Procedure Laterality Date   • CARDIAC CATHETERIZATION      Dr. Mariusz Blanc at PAC 70% EF  mild to moderate left main disease,mild proximal LAD disease. Medical Therapy recommended   • CATARACT EXTRACTION     • CIRCUMCISION      done at 32 years old   • EYE SURGERY Right      tear in the right/Repair Of Retinal Detachment   • HERNIA REPAIR      Inguinal Sliding- Dr. Negrete   • PROSTATE SURGERY       cancer w/ radiation   • THROAT SURGERY      vocal cord polyp removed 2x's  cancer w/ radiation  Dr. Olivares   • TONSILLECTOMY      With Adenoidectomy       Family History   Problem Relation Age of Onset   • Stroke Mother    • Cancer Father        Social History     Social History   • Marital status:      Social History Main Topics   • Smoking status: Former Smoker     Quit date: 6/13/1981   • Smokeless tobacco: Never Used   • Alcohol use No   • Drug use: No     Other Topics Concern   • Not on file           Objective   Physical Exam   Constitutional: He is oriented to person, place, and time. He appears  well-developed and well-nourished.   HENT:   Head: Normocephalic and atraumatic.   Left Ear: External ear normal.   Eyes: EOM are normal. Pupils are equal, round, and reactive to light.   Neck: Normal range of motion.   Cardiovascular: Normal rate and regular rhythm.    Pulmonary/Chest: Effort normal and breath sounds normal.   Abdominal: Soft. Bowel sounds are normal.   Musculoskeletal: Normal range of motion. He exhibits no edema or deformity.   palpation of the top of the right foot between the third and fourth toe.   Neurological: He is alert and oriented to person, place, and time.   Skin: Skin is warm and dry.   Psychiatric: He has a normal mood and affect. His behavior is normal. Judgment and thought content normal.   Vitals reviewed.      Procedures         ED Course  ED Course                  MDM    Final diagnoses:   Lumbar radiculopathy            Gee Gómez Jr., PA-BARRON  03/16/18 1501

## 2018-03-16 NOTE — TELEPHONE ENCOUNTER
I SPOKE WITH MELISSA AFTER RECEIVING A VM FROM HIM. HE WAS SEEN AT [facility] ER.  HE HAD SHOOTING PAIN DOWN RIGHT LEG. WAS PRESCRIBED HYDROCODONE TO HELP WITH THE PAIN     GENE SAYS THE PHYSICIAN AT THE ER MENTINED GETTING A SCAN TO SEE IF THE PAIN IS FROM HIS L3/L4 ISSUES.    PATIENT REQUESTED AN APPOINTMENT FOR Monday BUT I REQUESTED HE LET ME TALK TO DR. REEVES ABOUT THIS FIRST THEN CALL HIM WITH DR. REEVES'S RECOMMENDATIONS

## 2018-03-26 ENCOUNTER — OFFICE VISIT (OUTPATIENT)
Dept: INTERNAL MEDICINE | Facility: CLINIC | Age: 83
End: 2018-03-26

## 2018-03-26 VITALS
WEIGHT: 144 LBS | HEIGHT: 71 IN | TEMPERATURE: 97.8 F | DIASTOLIC BLOOD PRESSURE: 70 MMHG | BODY MASS INDEX: 20.16 KG/M2 | SYSTOLIC BLOOD PRESSURE: 140 MMHG | OXYGEN SATURATION: 98 % | HEART RATE: 57 BPM

## 2018-03-26 DIAGNOSIS — G89.29 CHRONIC RIGHT-SIDED LOW BACK PAIN WITH RIGHT-SIDED SCIATICA: Primary | ICD-10-CM

## 2018-03-26 DIAGNOSIS — M54.41 CHRONIC RIGHT-SIDED LOW BACK PAIN WITH RIGHT-SIDED SCIATICA: Primary | ICD-10-CM

## 2018-03-26 PROCEDURE — 99213 OFFICE O/P EST LOW 20 MIN: CPT | Performed by: INTERNAL MEDICINE

## 2018-03-26 RX ORDER — HYDROCODONE BITARTRATE AND ACETAMINOPHEN 5; 325 MG/1; MG/1
1 TABLET ORAL EVERY 8 HOURS PRN
Qty: 10 TABLET | Refills: 0 | Status: SHIPPED | OUTPATIENT
Start: 2018-03-26 | End: 2018-06-22

## 2018-03-26 NOTE — PROGRESS NOTES
"Pernell Nielson is a 83 y.o. male    HPI coming in for evaluation accompanied by his wife was giving us some of the history. Recent ER visit for complains of right foot pain he has had complaints of low back pain with radiculopathy in the past in the ER he underwent a workup with an x-ray of his foot which was unremarkable. He was placed on NSAIDs with some relief. Now appears to be doing significantly better. Denies urinary or bowel symptoms    The following portions of the patient's history were reviewed and updated as appropriate: allergies, current medications, past family history, past medical history, past social history, past surgical history, and problem list.     Review of Systems   Constitutional: Negative.  Negative for activity change, appetite change, fatigue and fever.   HENT: Positive for congestion, postnasal drip and rhinorrhea. Negative for ear discharge, ear pain and trouble swallowing.    Eyes: Negative for photophobia and visual disturbance.   Respiratory: Negative for cough and shortness of breath.    Cardiovascular: Negative for chest pain and palpitations.   Gastrointestinal: Negative for abdominal distention, abdominal pain, constipation, diarrhea, nausea and vomiting.   Endocrine: Negative.    Genitourinary: Negative for dysuria, hematuria and urgency.        Nocturia   Musculoskeletal: Positive for arthralgias and gait problem. Negative for back pain, joint swelling and myalgias.   Skin: Negative for color change and rash.   Allergic/Immunologic: Negative.    Neurological: Negative for dizziness, weakness, light-headedness and headaches.        Rt foot drop   Hematological: Negative for adenopathy. Does not bruise/bleed easily.   Psychiatric/Behavioral: Negative for agitation, confusion and dysphoric mood. The patient is not nervous/anxious.        Visit Vitals  /70   Pulse 57   Temp 97.8 °F (36.6 °C)   Ht 180.3 cm (71\")   Wt 65.3 kg (144 lb)   SpO2 98%   BMI 20.08 kg/m² "       Objective  Physical Exam   Constitutional: He is oriented to person, place, and time. He appears well-developed and well-nourished. No distress.   HENT:   Nose: Nose normal.   Mouth/Throat: Oropharynx is clear and moist.   Eyes: Conjunctivae and EOM are normal. No scleral icterus.   Neck: No tracheal deviation present. No thyromegaly present.   Cardiovascular: Normal rate and regular rhythm.  Exam reveals no friction rub.    No murmur heard.  Pulmonary/Chest: No respiratory distress. He has no wheezes. He has no rales.   Abdominal: Soft. He exhibits no distension and no mass. There is no tenderness. There is no guarding.   Musculoskeletal: Normal range of motion. He exhibits no deformity.   Lymphadenopathy:     He has no cervical adenopathy.   Neurological: He is alert and oriented to person, place, and time. He has normal reflexes. No cranial nerve deficit. Coordination normal.   Rt foot drop   Skin: Skin is warm and dry. No rash noted. No erythema.   Psychiatric: He has a normal mood and affect. His behavior is normal. Judgment and thought content normal.       Diagnoses and all orders for this visit:    Chronic right-sided low back pain with right-sided sciatica continue with home exercises. Currently he is symptom-free hydrocodone when necessary only for exacerbation of symptoms. ER records reviewed patient did not have imaging studies of his LS-spine if he should have recurrence of symptoms may need to do so.

## 2018-05-17 ENCOUNTER — APPOINTMENT (OUTPATIENT)
Dept: CT IMAGING | Facility: HOSPITAL | Age: 83
End: 2018-05-17

## 2018-05-17 ENCOUNTER — HOSPITAL ENCOUNTER (EMERGENCY)
Facility: HOSPITAL | Age: 83
Discharge: HOME | End: 2018-05-17
Attending: EMERGENCY MEDICINE | Admitting: EMERGENCY MEDICINE

## 2018-05-17 VITALS
WEIGHT: 140 LBS | HEIGHT: 71 IN | DIASTOLIC BLOOD PRESSURE: 87 MMHG | HEART RATE: 56 BPM | BODY MASS INDEX: 19.6 KG/M2 | OXYGEN SATURATION: 98 % | TEMPERATURE: 98.2 F | SYSTOLIC BLOOD PRESSURE: 148 MMHG | RESPIRATION RATE: 16 BRPM

## 2018-05-17 DIAGNOSIS — S01.01XA SCALP LACERATION, INITIAL ENCOUNTER: ICD-10-CM

## 2018-05-17 DIAGNOSIS — J01.00 ACUTE MAXILLARY SINUSITIS, RECURRENCE NOT SPECIFIED: ICD-10-CM

## 2018-05-17 DIAGNOSIS — S09.90XA ACUTE HEAD INJURY WITHOUT LOSS OF CONSCIOUSNESS, INITIAL ENCOUNTER: ICD-10-CM
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
## 2018-05-17 PROCEDURE — 99283 EMERGENCY DEPT VISIT LOW MDM: CPT

## 2018-05-17 PROCEDURE — 25010000002 TDAP 5-2.5-18.5 LF-MCG/0.5 SUSPENSION: Performed by: PHYSICIAN ASSISTANT

## 2018-05-17 PROCEDURE — 90715 TDAP VACCINE 7 YRS/> IM: CPT | Performed by: PHYSICIAN ASSISTANT

## 2018-05-17 PROCEDURE — 70450 CT HEAD/BRAIN W/O DYE: CPT

## 2018-05-17 PROCEDURE — 90471 IMMUNIZATION ADMIN: CPT | Performed by: PHYSICIAN ASSISTANT

## 2018-05-17 RX ORDER — AMOXICILLIN 875 MG/1
875 TABLET, COATED ORAL 2 TIMES DAILY
Qty: 28 TABLET | Refills: 0 | Status: SHIPPED | OUTPATIENT
Start: 2018-05-17 | End: 2018-06-22

## 2018-05-17 RX ORDER — LIDOCAINE HYDROCHLORIDE AND EPINEPHRINE 10; 10 MG/ML; UG/ML
10 INJECTION, SOLUTION INFILTRATION; PERINEURAL ONCE
Status: DISCONTINUED | OUTPATIENT
Start: 2018-05-17 | End: 2018-05-17 | Stop reason: HOSPADM

## 2018-05-17 RX ADMIN — TETANUS TOXOID, REDUCED DIPHTHERIA TOXOID AND ACELLULAR PERTUSSIS VACCINE, ADSORBED 0.5 ML: 5; 2.5; 8; 8; 2.5 SUSPENSION INTRAMUSCULAR at 15:14

## 2018-05-17 NOTE — DISCHARGE INSTRUCTIONS
Keep yourr scalp clean with soap and water.  No peroxide or alcohol.  Apply Neosporin twice daily.  Return to the ER for signs of infection at the wound site which include: redness, swelling, puslike drainage, fever, red streaks from the wound.    Also return to the ER for severe headache, vomiting more than twice, confusion, disorientation, trouble walking or trouble talking.    Staples to be removed in 7 days.  Return to the ER for this or have your RN daughter remove them.

## 2018-05-25 ENCOUNTER — OFFICE VISIT (OUTPATIENT)
Dept: INTERNAL MEDICINE | Facility: CLINIC | Age: 83
End: 2018-05-25

## 2018-05-25 VITALS
TEMPERATURE: 98.5 F | SYSTOLIC BLOOD PRESSURE: 140 MMHG | HEIGHT: 71 IN | HEART RATE: 69 BPM | WEIGHT: 141 LBS | OXYGEN SATURATION: 98 % | DIASTOLIC BLOOD PRESSURE: 80 MMHG | BODY MASS INDEX: 19.74 KG/M2

## 2018-05-25 DIAGNOSIS — S01.01XD OCCIPITAL SCALP LACERATION, SUBSEQUENT ENCOUNTER: ICD-10-CM

## 2018-05-25 DIAGNOSIS — J32.0 MAXILLARY SINUSITIS, UNSPECIFIED CHRONICITY: ICD-10-CM

## 2018-05-25 DIAGNOSIS — Z48.02 ENCOUNTER FOR STAPLE REMOVAL: Primary | ICD-10-CM

## 2018-05-25 PROCEDURE — 99214 OFFICE O/P EST MOD 30 MIN: CPT | Performed by: PHYSICIAN ASSISTANT

## 2018-06-22 ENCOUNTER — OFFICE VISIT (OUTPATIENT)
Dept: INTERNAL MEDICINE | Facility: CLINIC | Age: 83
End: 2018-06-22

## 2018-06-22 VITALS
TEMPERATURE: 97.7 F | HEART RATE: 61 BPM | WEIGHT: 139 LBS | OXYGEN SATURATION: 98 % | DIASTOLIC BLOOD PRESSURE: 60 MMHG | HEIGHT: 71 IN | SYSTOLIC BLOOD PRESSURE: 100 MMHG | BODY MASS INDEX: 19.46 KG/M2

## 2018-06-22 DIAGNOSIS — C61 MALIGNANT NEOPLASM OF PROSTATE (HCC): ICD-10-CM

## 2018-06-22 DIAGNOSIS — I10 ESSENTIAL HYPERTENSION: ICD-10-CM

## 2018-06-22 DIAGNOSIS — Z23 NEED FOR VACCINATION: Primary | ICD-10-CM

## 2018-06-22 DIAGNOSIS — I25.10 CORONARY ARTERIOSCLEROSIS IN NATIVE ARTERY: ICD-10-CM

## 2018-06-22 DIAGNOSIS — M21.371 RIGHT FOOT DROP: ICD-10-CM

## 2018-06-22 DIAGNOSIS — C32.9 LARYNGEAL CANCER (HCC): ICD-10-CM

## 2018-06-22 DIAGNOSIS — E78.2 MIXED HYPERLIPIDEMIA: ICD-10-CM

## 2018-06-22 PROCEDURE — G0439 PPPS, SUBSEQ VISIT: HCPCS | Performed by: INTERNAL MEDICINE

## 2018-06-22 PROCEDURE — 90670 PCV13 VACCINE IM: CPT | Performed by: INTERNAL MEDICINE

## 2018-06-22 PROCEDURE — G0009 ADMIN PNEUMOCOCCAL VACCINE: HCPCS | Performed by: INTERNAL MEDICINE

## 2018-06-22 PROCEDURE — 99397 PER PM REEVAL EST PAT 65+ YR: CPT | Performed by: INTERNAL MEDICINE

## 2018-06-22 RX ORDER — CARVEDILOL 12.5 MG/1
25 TABLET ORAL 2 TIMES DAILY
Qty: 180 TABLET | Refills: 3 | Status: SHIPPED | OUTPATIENT
Start: 2018-06-22 | End: 2019-08-06

## 2018-06-22 NOTE — PROGRESS NOTES
QUICK REFERENCE INFORMATION:  The ABCs of the Annual Wellness Visit    Subsequent Medicare Wellness Visit    HEALTH RISK ASSESSMENT  84-year-old male with prostate and laryngeal CA he has, coronary artery disease currently angina free is coming in for wellness visit. He is accompanied by his wife was giving us some of the history  1934    Recent Hospitalizations:  No hospitalization(s) within the last year..        Current Medical Providers:  Patient Care Team:  Vinayak Arenas MD as PCP - General  Shakeel Saavedra MD as Consulting Physician (Cardiology)        Smoking Status:  History   Smoking Status   • Former Smoker   • Quit date: 6/13/1981   Smokeless Tobacco   • Never Used       Alcohol Consumption:  History   Alcohol Use No       Depression Screen:   PHQ-2/PHQ-9 Depression Screening 6/22/2018   Little interest or pleasure in doing things 0   Feeling down, depressed, or hopeless 0   Total Score 0       Health Habits and Functional and Cognitive Screening:  Functional & Cognitive Status 6/22/2018   Do you have difficulty preparing food and eating? No   Do you have difficulty bathing yourself, getting dressed or grooming yourself? No   Do you have difficulty using the toilet? No   Do you have difficulty moving around from place to place? Yes   Do you have trouble with steps or getting out of a bed or a chair? Yes   In the past year have you fallen or experienced a near fall? Yes   Current Diet Well Balanced Diet   Dental Exam Up to date   Eye Exam Up to date   Exercise (times per week) 0 times per week   Current Exercise Activities Include None   Do you need help using the phone?  No   Are you deaf or do you have serious difficulty hearing?  No   Do you need help with transportation? Yes   Do you need help shopping? Yes   Do you need help preparing meals?  No   Do you need help with housework?  No   Do you need help with laundry? No   Do you need help taking your medications? No   Do you need help managing  money? No   Do you ever drive or ride in a car without wearing a seat belt? No   Have you felt unusual stress, anger or loneliness in the last month? Yes   Who do you live with? Spouse   If you need help, do you have trouble finding someone available to you? No   Have you been bothered in the last four weeks by sexual problems? No   Do you have difficulty concentrating, remembering or making decisions? No           Does the patient have evidence of cognitive impairment? No    Aspirin use counseling: Taking ASA appropriately as indicated      Recent Lab Results:  CMP:  Lab Results   Component Value Date    BUN 21 12/05/2016    CREATININE 1.10 12/05/2016    EGFRIFNONA 64 12/05/2016    BCR 19.1 12/05/2016     12/05/2016    K 4.7 12/05/2016    CO2 26.0 12/05/2016    CALCIUM 9.4 12/05/2016    ALBUMIN 4.40 12/05/2016    LABIL2 1.6 12/05/2016    BILITOT 1.0 12/05/2016    ALKPHOS 56 12/05/2016    AST 26 12/05/2016    ALT 21 12/05/2016     Lipid Panel:  Lab Results   Component Value Date    TRIG 78 08/15/2014    HDL 38 (L) 08/15/2014     HbA1c:       Visual Acuity:  No exam data present    Age-appropriate Screening Schedule:  Refer to the list below for future screening recommendations based on patient's age, sex and/or medical conditions. Orders for these recommended tests are listed in the plan section. The patient has been provided with a written plan.    Health Maintenance   Topic Date Due   • PNEUMOCOCCAL VACCINES (65+ LOW/MEDIUM RISK) (2 of 2 - PPSV23) 11/08/2011   • ZOSTER VACCINE (2 of 2) 01/09/2012   • LIPID PANEL  12/11/2016   • INFLUENZA VACCINE  08/01/2018   • TDAP/TD VACCINES (2 - Td) 05/17/2028        Subjective   History of Present Illness    Shane Nielson is a 84 y.o. male who presents for an Subsequent Wellness Visit.    The following portions of the patient's history were reviewed and updated as appropriate: allergies, current medications, past family history, past medical history, past social history,  past surgical history and problem list.    Outpatient Medications Prior to Visit   Medication Sig Dispense Refill   • amLODIPine (NORVASC) 5 MG tablet TAKE 1 TABLET DAILY 90 tablet 3   • aspirin 81 MG tablet Take 1 tablet by mouth Daily. 100 tablet 2   • carvedilol (COREG) 25 MG tablet TAKE 1 TABLET TWICE A  tablet 3   • mometasone (NASONEX) 50 MCG/ACT nasal spray into each nostril.     • montelukast (SINGULAIR) 10 MG tablet TAKE 1 TABLET DAILY 90 tablet 3   • simvastatin (ZOCOR) 40 MG tablet TAKE 1 TABLET DAILY 90 tablet 3   • amoxicillin (AMOXIL) 875 MG tablet Take 1 tablet by mouth 2 (Two) Times a Day. 28 tablet 0   • HYDROcodone-acetaminophen (NORCO) 5-325 MG per tablet Take 1 tablet by mouth Every 8 (Eight) Hours As Needed for Severe Pain . 10 tablet 0     No facility-administered medications prior to visit.        Patient Active Problem List   Diagnosis   • Mixed hyperlipidemia   • Essential hypertension   • Malignant neoplasm of prostate   • Coronary arteriosclerosis in native artery   • Arthritis   • Edema   • Right foot drop   • Laryngeal cancer       Advance Care Planning:  has an advance directive - a copy HAS NOT been provided    Identification of Risk Factors:  Risk factors include: increased fall risk, hearing limitations and polypharmacy.    Review of Systems   Constitutional: Negative.  Negative for activity change, appetite change, fatigue and fever.   HENT: Positive for congestion, postnasal drip and rhinorrhea. Negative for ear discharge, ear pain and trouble swallowing.    Eyes: Negative for photophobia and visual disturbance.   Respiratory: Negative for cough and shortness of breath.    Cardiovascular: Negative for chest pain and palpitations.   Gastrointestinal: Negative for abdominal distention, abdominal pain, constipation, diarrhea, nausea and vomiting.   Endocrine: Negative.    Genitourinary: Negative for dysuria, hematuria and urgency.        Nocturia   Musculoskeletal: Positive for  "arthralgias and gait problem. Negative for back pain, joint swelling and myalgias.   Skin: Negative for color change and rash.   Allergic/Immunologic: Negative.    Neurological: Negative for dizziness, weakness, light-headedness and headaches.        Rt foot drop   Hematological: Negative for adenopathy. Does not bruise/bleed easily.   Psychiatric/Behavioral: Negative for agitation, confusion, dysphoric mood and sleep disturbance. The patient is not nervous/anxious.        Compared to one year ago, the patient feels his physical health is the same.  Compared to one year ago, the patient feels his mental health is the same.    Objective     Physical Exam   Constitutional: He is oriented to person, place, and time. He appears well-developed and well-nourished. No distress.   HENT:   Nose: Nose normal.   Mouth/Throat: Oropharynx is clear and moist.   Eyes: Conjunctivae and EOM are normal. No scleral icterus.   Neck: No tracheal deviation present. No thyromegaly present.   Cardiovascular: Normal rate and regular rhythm.  Exam reveals no friction rub.    No murmur heard.  Pulmonary/Chest: No respiratory distress. He has no wheezes. He has no rales.   Abdominal: Soft. He exhibits no distension and no mass. There is no tenderness. There is no guarding.   Musculoskeletal: Normal range of motion. He exhibits no deformity.   Lymphadenopathy:     He has no cervical adenopathy.   Neurological: He is alert and oriented to person, place, and time. He has normal reflexes. No cranial nerve deficit. Coordination normal.   Rt foot drop   Skin: Skin is warm and dry. No rash noted. No erythema.   Psychiatric: He has a normal mood and affect. His behavior is normal. Judgment and thought content normal.       Vitals:    06/22/18 0902   BP: 100/60   Pulse: 61   Temp: 97.7 °F (36.5 °C)   SpO2: 98%   Weight: 63 kg (139 lb)   Height: 180.3 cm (70.98\")       Patient's Body mass index is 19.4 kg/m². BMI is below normal parameters. " Recommendations include: discussed diet.      Assessment/Plan   Patient Self-Management and Personalized Health Advice  The patient has been provided with information about: fall prevention and preventive services including:   · Fall Risk assessment done, Fall Risk plan of care done, Nutrition counseling provided, Urinary Incontinence assessment done.    Visit Diagnoses:    ICD-10-CM ICD-9-CM   1. Need for vaccination Z23 V05.9   2. Laryngeal cancerStable symptom-free  C32.9 161.9   3. Malignant neoplasm of prostateStable recent PSA showed stable numbers he is following up with urology   C61 185   4. Coronary arteriosclerosis in native artery. Stable angina free  I25.10 414.01   5. Essential hypertension. Continue with the dietary restrictions and antihypertensive meds  I10 401.9   6. Mixed hyperlipidemia. Stable  E78.2 272.2   7. Right foot drop. Continue with orthotics  M21.371 736.79       Orders Placed This Encounter   Procedures   • Pneumococcal Conjugate Vaccine 13-Valent All       Outpatient Encounter Prescriptions as of 6/22/2018   Medication Sig Dispense Refill   • amLODIPine (NORVASC) 5 MG tablet TAKE 1 TABLET DAILY 90 tablet 3   • aspirin 81 MG tablet Take 1 tablet by mouth Daily. 100 tablet 2   • carvedilol (COREG) 25 MG tablet TAKE 1 TABLET TWICE A  tablet 3   • mometasone (NASONEX) 50 MCG/ACT nasal spray into each nostril.     • montelukast (SINGULAIR) 10 MG tablet TAKE 1 TABLET DAILY 90 tablet 3   • simvastatin (ZOCOR) 40 MG tablet TAKE 1 TABLET DAILY 90 tablet 3   • [DISCONTINUED] amoxicillin (AMOXIL) 875 MG tablet Take 1 tablet by mouth 2 (Two) Times a Day. 28 tablet 0   • [DISCONTINUED] HYDROcodone-acetaminophen (NORCO) 5-325 MG per tablet Take 1 tablet by mouth Every 8 (Eight) Hours As Needed for Severe Pain . 10 tablet 0     No facility-administered encounter medications on file as of 6/22/2018.        Reviewed use of high risk medication in the elderly: yes  Reviewed for potential of  harmful drug interactions in the elderly: yes    Follow Up:  No Follow-up on file.     An After Visit Summary and PPPS with all of these plans were given to the patient.

## 2018-06-22 NOTE — PATIENT INSTRUCTIONS
Medicare Wellness  Personal Prevention Plan of Service     Date of Office Visit:  2018  Encounter Provider:  Vinayak Arenas MD  Place of Service:  [facility]  Patient Name: Shane Nielson  :  1934    As part of the Medicare Wellness portion of your visit today, we are providing you with this personalized preventive plan of services (PPPS). This plan is based upon recommendations of the [place] Preventive Services Task Force (USPSTF) and the Advisory Committee on Immunization Practices (ACIP).    This lists the preventive care services that should be considered, and provides dates of when you are due. Items listed as completed are up-to-date and do not require any further intervention.    Health Maintenance   Topic Date Due   • PNEUMOCOCCAL VACCINES (65+ LOW/MEDIUM RISK) (2 of 2 - PPSV23) 2011   • ZOSTER VACCINE (2 of 2) 2012   • LIPID PANEL  2016   • MEDICARE ANNUAL WELLNESS  2018   • INFLUENZA VACCINE  2018   • TDAP/TD VACCINES (2 - Td) 2028       Orders Placed This Encounter   Procedures   • Pneumococcal Conjugate Vaccine 13-Valent All       No Follow-up on file.

## 2018-08-27 ENCOUNTER — OFFICE VISIT (OUTPATIENT)
Dept: CARDIOLOGY | Facility: CLINIC | Age: 83
End: 2018-08-27

## 2018-08-27 VITALS
DIASTOLIC BLOOD PRESSURE: 60 MMHG | SYSTOLIC BLOOD PRESSURE: 118 MMHG | WEIGHT: 150 LBS | HEART RATE: 52 BPM | HEIGHT: 70 IN | BODY MASS INDEX: 21.47 KG/M2

## 2018-08-27 DIAGNOSIS — I10 ESSENTIAL HYPERTENSION: ICD-10-CM

## 2018-08-27 DIAGNOSIS — I25.10 CORONARY ARTERY DISEASE INVOLVING NATIVE CORONARY ARTERY OF NATIVE HEART WITHOUT ANGINA PECTORIS: Primary | ICD-10-CM

## 2018-08-27 DIAGNOSIS — E78.2 MIXED HYPERLIPIDEMIA: ICD-10-CM

## 2018-08-27 PROCEDURE — 99213 OFFICE O/P EST LOW 20 MIN: CPT | Performed by: INTERNAL MEDICINE

## 2018-08-27 NOTE — PROGRESS NOTES
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 CAD  1. 1999 LHC nonobst  2. HTN  3. HL   1. 2016 LDL 24  4. 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 XRT 2000  6. R foot drop- brace ([place])  7. Prostate CA XRT ([place])    Allergies  Allergies   Allergen Reactions   • Grass        Current Medications    Current Outpatient Prescriptions:   •  amLODIPine (NORVASC) 5 MG tablet, TAKE 1 TABLET DAILY, Disp: 90 tablet, Rfl: 3  •  aspirin 81 MG tablet, Take 1 tablet by mouth Daily., Disp: 100 tablet, Rfl: 2  •  carvedilol (COREG) 12.5 MG tablet, Take 2 tablets by mouth 2 (Two) Times a Day., Disp: 180 tablet, Rfl: 3  •  mometasone (NASONEX) 50 MCG/ACT nasal spray, into each nostril., Disp: , Rfl:   •  montelukast (SINGULAIR) 10 MG tablet, TAKE 1 TABLET DAILY, Disp: 90 tablet, Rfl: 3  •  simvastatin (ZOCOR) 40 MG tablet, TAKE 1 TABLET DAILY, Disp: 90 tablet, Rfl: 3      History of Present Illness   Pt here for follow up, no new cardiac complaints. Follows with Dr. Arenas regularly for labs. HLD stable. HTN stable. Walks for exercise as tolerant w cane. Pt denies any chest pain, dyspnea, dyspnea on exertion, orthopnea, PND, palpitations, lower extremity edema, or claudication.    ROS:  All systems have been reviewed and are negative with the exception of those mentioned in the HPI.    OBJECTIVE:  Vitals:    08/27/18 0939   BP: 118/60   BP Location: Right arm   Patient Position: Sitting   Pulse: 52   Weight: 68 kg (150 lb)   Height: 177.8 cm (70\")     Physical Exam   Constitutional: He is oriented to person, place, and time. He appears well-developed and well-nourished. No distress.   Cardiovascular: Normal rate, " regular rhythm, normal heart sounds and intact distal pulses.    No murmur heard.  Pulses:       Radial pulses are 2+ on the right side, and 2+ on the left side.        Dorsalis pedis pulses are 2+ on the right side, and 2+ on the left side.        Posterior tibial pulses are 2+ on the right side, and 2+ on the left side.   Pulmonary/Chest: Effort normal and breath sounds normal. He has no wheezes. He has no rales.   Abdominal: Soft. Bowel sounds are normal. There is no tenderness. There is no guarding.   Musculoskeletal: He exhibits no edema or tenderness.   R foot in brace   Neurological: He is alert and oriented to person, place, and time.   Skin: Skin is warm and dry. No rash noted.   Psychiatric: He has a normal mood and affect.   Nursing note and vitals reviewed.      Diagnostic Data:  Procedures      ASSESSMENT:   Diagnosis Plan   1. Coronary artery disease involving native coronary artery of native heart without angina pectoris     2. Mixed hyperlipidemia     3. Essential hypertension         PLAN:  1. No anginal equiv, continue medical management  2. Well controlled on low dose simvastatin  3. Well controlled with lwo nl BP, no symptomatic hypotension. Continue current tx and consider decreasing amlodipine if any hypotensive symptoms. Further needs fu re anemia    RTC 1 year, sooner if needed.      8/27/2018  11:11 AM    Shkaeel Saavedra MD, FACC

## 2018-10-29 RX ORDER — SIMVASTATIN 40 MG
TABLET ORAL
Qty: 90 TABLET | Refills: 3 | Status: SHIPPED | OUTPATIENT
Start: 2018-10-29 | End: 2019-02-01 | Stop reason: SDUPTHER

## 2018-11-01 RX ORDER — MONTELUKAST SODIUM 10 MG/1
TABLET ORAL
Qty: 90 TABLET | Refills: 3 | Status: SHIPPED | OUTPATIENT
Start: 2018-11-01 | End: 2019-08-14

## 2018-11-27 ENCOUNTER — OFFICE VISIT (OUTPATIENT)
Dept: UROLOGY | Facility: CLINIC | Age: 83
End: 2018-11-27

## 2018-11-27 DIAGNOSIS — Z85.46 PERSONAL HISTORY OF PROSTATE CANCER: Primary | ICD-10-CM

## 2018-11-27 PROCEDURE — 99213 OFFICE O/P EST LOW 20 MIN: CPT | Performed by: UROLOGY

## 2018-11-27 PROCEDURE — 81003 URINALYSIS AUTO W/O SCOPE: CPT | Performed by: UROLOGY

## 2018-11-27 NOTE — PROGRESS NOTES
Chief Complaint  Personal history of prostate cancer        BENITA Nielson is a 84 y.o. male who returns today for annual checkup with a distant history of prostate cancer treated with external beam radiation therapy with good effect.  He's had a PSA was normal for 20 years subsequently.  He describes an AUA symptom index today of only 2 and declines my offer of digital rectal exam and PSA today.    He states he tripped over his rocker and cut his head but his had otherwise an uneventful year.    There were no vitals filed for this visit.    Past Medical History  Past Medical History:   Diagnosis Date   • Allergic rhinitis    • Arthritis    • Asthma     AS CHILD   • Edema    • Foot drop, right foot    • Hypercholesterolemia    • Hypertension    • Laryngeal cancer (CMS/HCC)     vocal cords   • Prostate cancer (CMS/HCC)        Past Surgical History  Past Surgical History:   Procedure Laterality Date   • CARDIAC CATHETERIZATION      Dr. Mariusz Blanc at [facility] 70% EF  mild to moderate left main disease,mild proximal LAD disease. Medical Therapy recommended   • CATARACT EXTRACTION     • CIRCUMCISION      done at 32 years old   • EYE SURGERY Right      tear in the right/Repair Of Retinal Detachment   • HERNIA REPAIR      Inguinal Sliding- Dr. Negrete   • PROSTATE SURGERY       cancer w/ radiation   • THROAT SURGERY      vocal cord polyp removed 2x's  cancer w/ radiation  Dr. Olivares   • TONSILLECTOMY      With Adenoidectomy       Medications  has a current medication list which includes the following prescription(s): amlodipine, aspirin, carvedilol, mometasone, montelukast, and simvastatin.      Allergies  Allergies   Allergen Reactions   • Grass        Social History  Social History     Social History Narrative   • Not on file       Family History  He has no family history of bladder or kidney cancer  He has no family history of kidney stones      AUA Symptom Score:      Review of Systems  Review of Systems   Constitutional:  Negative.    Genitourinary: Negative.    All other systems reviewed and are negative.      Physical Exam  Physical Exam    Labs Recent and today in the office:  Results for orders placed or performed in visit on 11/27/18   POC Urinalysis Dipstick, Automated   Result Value Ref Range    Color Yellow Yellow, Straw, Dark Yellow, Lidia    Clarity, UA Clear Clear    Specific Gravity  1.025 1.005 - 1.030    pH, Urine 6.0 5.0 - 8.0    Leukocytes Negative Negative    Nitrite, UA Negative Negative    Protein, POC Negative Negative mg/dL    Glucose, UA Negative Negative, 1000 mg/dL (3+) mg/dL    Ketones, UA Negative Negative    Urobilinogen, UA Normal Normal    Bilirubin Negative Negative    Blood, UA Negative Negative         Assessment & Plan  #1 prostate cancer: In remission 20 years after radiation therapy.  He return on an annual basis and when necessary. if he ever becomes symptomatic I would recommend hormonal suppression.

## 2018-12-03 RX ORDER — AMLODIPINE BESYLATE 5 MG/1
TABLET ORAL
Qty: 90 TABLET | Refills: 3 | Status: SHIPPED | OUTPATIENT
Start: 2018-12-03 | End: 2019-11-30 | Stop reason: SDUPTHER

## 2019-02-01 ENCOUNTER — OFFICE VISIT (OUTPATIENT)
Dept: INTERNAL MEDICINE | Facility: CLINIC | Age: 84
End: 2019-02-01

## 2019-02-01 VITALS
HEART RATE: 57 BPM | DIASTOLIC BLOOD PRESSURE: 66 MMHG | TEMPERATURE: 97.7 F | OXYGEN SATURATION: 98 % | SYSTOLIC BLOOD PRESSURE: 134 MMHG | HEIGHT: 71 IN | BODY MASS INDEX: 19.6 KG/M2 | WEIGHT: 140 LBS

## 2019-02-01 DIAGNOSIS — C32.9 LARYNGEAL CANCER (HCC): ICD-10-CM

## 2019-02-01 DIAGNOSIS — C61 MALIGNANT NEOPLASM OF PROSTATE (HCC): Primary | ICD-10-CM

## 2019-02-01 DIAGNOSIS — I10 ESSENTIAL HYPERTENSION: ICD-10-CM

## 2019-02-01 DIAGNOSIS — E78.2 MIXED HYPERLIPIDEMIA: ICD-10-CM

## 2019-02-01 PROCEDURE — 99214 OFFICE O/P EST MOD 30 MIN: CPT | Performed by: INTERNAL MEDICINE

## 2019-02-01 RX ORDER — SIMVASTATIN 20 MG
40 TABLET ORAL DAILY
Qty: 90 TABLET | Refills: 3 | Status: SHIPPED | OUTPATIENT
Start: 2019-02-01 | End: 2019-02-21

## 2019-02-01 NOTE — PROGRESS NOTES
"Pernell Nielson is a 84 y.o. male    HPI coming in for follow-up is accompanied by his wife was giving us some of the history. Patient with prostate CA and laryngeal CA both currently under remission. He has hypertension and dyslipidemia. No alcohol or tobacco use    The following portions of the patient's history were reviewed and updated as appropriate: allergies, current medications, past family history, past medical history, past social history, past surgical history, and problem list.     Review of Systems   Constitutional: Negative.  Negative for activity change, appetite change, fatigue and fever.   HENT: Negative for congestion, ear discharge, ear pain and trouble swallowing.    Eyes: Negative for photophobia and visual disturbance.   Respiratory: Negative for cough and shortness of breath.    Cardiovascular: Negative for chest pain and palpitations.   Gastrointestinal: Negative for abdominal distention, abdominal pain, constipation, diarrhea, nausea and vomiting.   Endocrine: Negative.    Genitourinary: Negative for dysuria, hematuria and urgency.   Musculoskeletal: Positive for arthralgias, back pain and gait problem. Negative for joint swelling and myalgias.   Skin: Negative for color change and rash.   Allergic/Immunologic: Negative.    Neurological: Negative for dizziness, weakness, light-headedness and headaches.   Hematological: Negative for adenopathy. Does not bruise/bleed easily.   Psychiatric/Behavioral: Negative for agitation, confusion and dysphoric mood. The patient is not nervous/anxious.        Visit Vitals  /66 Comment: AUTOMATIC   Pulse 57   Temp 97.7 °F (36.5 °C) (Temporal)   Ht 180.3 cm (70.98\")   Wt 63.5 kg (140 lb)   SpO2 98%   BMI 19.54 kg/m²       Objective  Physical Exam   Constitutional: He is oriented to person, place, and time. He appears well-developed and well-nourished. No distress.   HENT:   Nose: Nose normal.   Mouth/Throat: Oropharynx is clear and moist. "   Eyes: Conjunctivae and EOM are normal. No scleral icterus.   Neck: No tracheal deviation present. No thyromegaly present.   Cardiovascular: Normal rate and regular rhythm. Exam reveals no friction rub.   No murmur heard.  Pulmonary/Chest: No respiratory distress. He has no wheezes. He has no rales.   Abdominal: Soft. He exhibits no distension and no mass. There is no tenderness. There is no guarding.   Musculoskeletal: Normal range of motion. He exhibits deformity.   Lymphadenopathy:     He has no cervical adenopathy.   Neurological: He is alert and oriented to person, place, and time. He has normal reflexes. No cranial nerve deficit. Coordination normal.   Skin: Skin is warm and dry. No rash noted. No erythema.   Psychiatric: He has a normal mood and affect. His behavior is normal. Judgment and thought content normal.       Diagnoses and all orders for this visit:    Malignant neoplasm of prostate (CMS/HCC) stable has been following up with urology. Does not have significant urinary symptoms    Laryngeal cancer (CMS/HCC) stable symptom-free    Essential hypertension stable with current meds and low-salt diet    Other orders  -     aspirin 81 MG tablet; Take 1 tablet by mouth Daily.  -     simvastatin (ZOCOR) 20 MG tablet; Take 2 tablets by mouth Daily for 20 days.

## 2019-02-02 LAB
ALBUMIN SERPL-MCNC: 4.4 G/DL (ref 3.5–5)
ALBUMIN/GLOB SERPL: 1.4 G/DL (ref 1–2)
ALP SERPL-CCNC: 52 U/L (ref 38–126)
ALT SERPL-CCNC: 21 U/L (ref 13–69)
AST SERPL-CCNC: 22 U/L (ref 15–46)
BILIRUB SERPL-MCNC: 0.6 MG/DL (ref 0.2–1.3)
BUN SERPL-MCNC: 16 MG/DL (ref 7–20)
BUN/CREAT SERPL: 14.5 (ref 6.3–21.9)
CALCIUM SERPL-MCNC: 9.3 MG/DL (ref 8.4–10.2)
CHLORIDE SERPL-SCNC: 101 MMOL/L (ref 98–107)
CO2 SERPL-SCNC: 26 MMOL/L (ref 26–30)
CREAT SERPL-MCNC: 1.1 MG/DL (ref 0.6–1.3)
GLOBULIN SER CALC-MCNC: 3.1 GM/DL
GLUCOSE SERPL-MCNC: 82 MG/DL (ref 74–98)
LDLC SERPL DIRECT ASSAY-MCNC: 68 MG/DL (ref 0–99)
POTASSIUM SERPL-SCNC: 4.7 MMOL/L (ref 3.5–5.1)
PROT SERPL-MCNC: 7.5 G/DL (ref 6.3–8.2)
SODIUM SERPL-SCNC: 135 MMOL/L (ref 137–145)

## 2019-02-08 ENCOUNTER — APPOINTMENT (OUTPATIENT)
Dept: CT IMAGING | Facility: HOSPITAL | Age: 84
End: 2019-02-08

## 2019-02-08 ENCOUNTER — HOSPITAL ENCOUNTER (EMERGENCY)
Facility: HOSPITAL | Age: 84
Discharge: HOME | End: 2019-02-08
Attending: EMERGENCY MEDICINE | Admitting: EMERGENCY MEDICINE

## 2019-02-08 VITALS
WEIGHT: 140 LBS | HEART RATE: 67 BPM | OXYGEN SATURATION: 96 % | BODY MASS INDEX: 19.6 KG/M2 | SYSTOLIC BLOOD PRESSURE: 160 MMHG | RESPIRATION RATE: 18 BRPM | DIASTOLIC BLOOD PRESSURE: 74 MMHG | TEMPERATURE: 98.4 F | HEIGHT: 71 IN

## 2019-02-08 DIAGNOSIS — S01.01XA LACERATION OF SCALP, INITIAL ENCOUNTER: Primary | ICD-10-CM

## 2019-02-08 DIAGNOSIS — S09.90XA ACUTE HEAD INJURY WITHOUT LOSS OF CONSCIOUSNESS, INITIAL ENCOUNTER: ICD-10-CM

## 2019-02-08 PROCEDURE — 70450 CT HEAD/BRAIN W/O DYE: CPT

## 2019-02-08 PROCEDURE — 99282 EMERGENCY DEPT VISIT SF MDM: CPT

## 2019-02-08 RX ORDER — LIDOCAINE HYDROCHLORIDE AND EPINEPHRINE 10; 10 MG/ML; UG/ML
10 INJECTION, SOLUTION INFILTRATION; PERINEURAL ONCE
Status: COMPLETED | OUTPATIENT
Start: 2019-02-08 | End: 2019-02-08

## 2019-02-08 RX ADMIN — LIDOCAINE HYDROCHLORIDE AND EPINEPHRINE 10 ML: 10; 10 INJECTION, SOLUTION INFILTRATION; PERINEURAL at 14:07

## 2019-02-08 NOTE — ED PROVIDER NOTES
Subjective   Pt states was getting up off toilet using rolling walker to assist himself up however wheels weren't locked and pt fell as a result and struck right posterior scalp on wooden cabinet. No LOC, is on daily asa. No visual changes, no vomiting/nausea. Normal mentation at this time. No neck pain, specifically no cervical vertebral tenderness and no other complaints.             Review of Systems   Skin:        2in vertical laceration to right posterior scalp     All other systems reviewed and are negative.      Past Medical History:   Diagnosis Date   • Allergic rhinitis    • Arthritis    • Asthma     AS CHILD   • Edema    • Foot drop, right foot    • Hypercholesterolemia    • Hypertension    • Laryngeal cancer (CMS/HCC)     vocal cords   • Prostate cancer (CMS/HCC)        Allergies   Allergen Reactions   • Grass        Past Surgical History:   Procedure Laterality Date   • CARDIAC CATHETERIZATION      Dr. Mariusz Blanc at [facility] 70% EF  mild to moderate left main disease,mild proximal LAD disease. Medical Therapy recommended   • CATARACT EXTRACTION     • CIRCUMCISION      done at 32 years old   • EYE SURGERY Right      tear in the right/Repair Of Retinal Detachment   • HERNIA REPAIR      Inguinal Sliding- Dr. Negrete   • PROSTATE SURGERY       cancer w/ radiation   • THROAT SURGERY      vocal cord polyp removed 2x's  cancer w/ radiation  Dr. Olivares   • TONSILLECTOMY      With Adenoidectomy       Family History   Problem Relation Age of Onset   • Stroke Mother    • Cancer Father        Social History     Socioeconomic History   • Marital status:      Spouse name: Not on file   • Number of children: Not on file   • Years of education: Not on file   • Highest education level: Not on file   Tobacco Use   • Smoking status: Former Smoker     Last attempt to quit: 1981     Years since quittin.6   • Smokeless tobacco: Never Used   Substance and Sexual Activity   • Alcohol use: No   • Drug use: No            Objective   Physical Exam   Constitutional: He is oriented to person, place, and time. He appears well-developed and well-nourished. No distress.   HENT:   Head: Head is without raccoon's eyes and without Dalal's sign.   Right Ear: External ear normal.   Left Ear: External ear normal.   Neck: Normal range of motion. No spinous process tenderness present.   Cardiovascular: Normal rate.   Pulmonary/Chest: Effort normal.   Musculoskeletal: Normal range of motion.   Neurological: He is alert and oriented to person, place, and time.   Skin: He is not diaphoretic.   2in vertical laceration to right posterior scalp.   Bleeding controlled at this time without intervention.   Psychiatric: He has a normal mood and affect. His behavior is normal. Judgment and thought content normal.       Laceration Repair  Date/Time: 2/8/2019 2:18 PM  Performed by: Valdemar Vila PA-C  Authorized by: Odalis Becerra MD     Consent:     Consent obtained:  Verbal    Consent given by:  Patient    Risks discussed:  Poor cosmetic result  Anesthesia (see MAR for exact dosages):     Anesthesia method:  Local infiltration    Local anesthetic:  Lidocaine 1% WITH epi  Laceration details:     Location:  Scalp    Scalp location:  Occipital    Length (cm):  2.5  Repair type:     Repair type:  Simple  Exploration:     Hemostasis achieved with:  Direct pressure    Contaminated: no    Treatment:     Area cleansed with:  Hibiclens    Amount of cleaning:  Standard    Irrigation solution:  Sterile saline    Visualized foreign bodies/material removed: no    Skin repair:     Repair method:  Staples    Number of staples:  6  Approximation:     Approximation:  Close    Vermilion border: well-aligned    Post-procedure details:     Dressing:  Open (no dressing)    Patient tolerance of procedure:  Tolerated well, no immediate complications               ED Course                  MDM      Final diagnoses:   Laceration of scalp, initial  encounter   Acute head injury without loss of consciousness, initial encounter            Valdemar Vila PA-C  02/08/19 3077

## 2019-02-08 NOTE — ED NOTES
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 Patient said he would follow up with Dr. Arenas to have miracle removed.     Tonja Woodall RN  02/08/19 3103

## 2019-02-12 ENCOUNTER — TELEPHONE (OUTPATIENT)
Dept: INTERNAL MEDICINE | Facility: CLINIC | Age: 84
End: 2019-02-12

## 2019-02-12 NOTE — TELEPHONE ENCOUNTER
Patient had staples put in in the ER and needs them removed next Monday. How does this need to be put on the schedule and who would do this?     pts daughter, Pearl Stern, is trying to get this scheduled for pt.  Phone # [phone]

## 2019-02-18 ENCOUNTER — PROCEDURE VISIT (OUTPATIENT)
Dept: INTERNAL MEDICINE | Facility: CLINIC | Age: 84
End: 2019-02-18

## 2019-02-18 VITALS
SYSTOLIC BLOOD PRESSURE: 128 MMHG | OXYGEN SATURATION: 98 % | HEIGHT: 71 IN | TEMPERATURE: 97.5 F | BODY MASS INDEX: 19.88 KG/M2 | DIASTOLIC BLOOD PRESSURE: 69 MMHG | WEIGHT: 142 LBS | HEART RATE: 60 BPM

## 2019-02-18 DIAGNOSIS — S01.01XA LACERATION OF SCALP, INITIAL ENCOUNTER: Primary | ICD-10-CM

## 2019-02-18 NOTE — PROGRESS NOTES
"Pernell Nielson is a 84 y.o. male    HPI coming in for follow-up for evaluation of her scalp own after an accidental fall about 10 days ago. Was evaluated in the emergency room and staples applied to his scalp laceration. A CT at that time was unremarkable. His wound appears to have healed well he denies headache or visual disturbances. No ataxia. He is accompanied by his wife was giving us some of the history    The following portions of the patient's history were reviewed and updated as appropriate: allergies, current medications, past family history, past medical history, past social history, past surgical history, and problem list.     Review of Systems   Constitutional: Negative.  Negative for activity change, appetite change, fatigue and fever.   HENT: Negative for congestion, ear discharge, ear pain and trouble swallowing.    Eyes: Negative for photophobia and visual disturbance.   Respiratory: Negative for cough and shortness of breath.    Cardiovascular: Negative for chest pain and palpitations.   Gastrointestinal: Negative for abdominal distention, abdominal pain, constipation, diarrhea, nausea and vomiting.   Endocrine: Negative.    Genitourinary: Negative for dysuria, hematuria and urgency.   Musculoskeletal: Positive for arthralgias and gait problem. Negative for back pain, joint swelling and myalgias.   Skin: Negative for color change and rash.   Allergic/Immunologic: Negative.    Neurological: Negative for dizziness, weakness, light-headedness and headaches.   Hematological: Negative for adenopathy. Does not bruise/bleed easily.   Psychiatric/Behavioral: Negative for agitation, confusion and dysphoric mood. The patient is not nervous/anxious.        Visit Vitals  /69 Comment: AUTOMATIC   Pulse 60   Temp 97.5 °F (36.4 °C) (Temporal)   Ht 180.3 cm (71\")   Wt 64.4 kg (142 lb)   SpO2 98%   BMI 19.80 kg/m²       Objective  Physical Exam   Constitutional: He is oriented to person, place, and " time. He appears well-developed and well-nourished. No distress.   HENT:   Head:       Nose: Nose normal.   Mouth/Throat: Oropharynx is clear and moist.   Staples intact, wound healing well   Eyes: Conjunctivae and EOM are normal. No scleral icterus.   Neck: No tracheal deviation present. No thyromegaly present.   Cardiovascular: Normal rate and regular rhythm. Exam reveals no friction rub.   No murmur heard.  Pulmonary/Chest: No respiratory distress. He has no wheezes. He has no rales.   Abdominal: Soft. He exhibits no distension and no mass. There is no tenderness. There is no guarding.   Musculoskeletal: Normal range of motion. He exhibits no deformity.   Lymphadenopathy:     He has no cervical adenopathy.   Neurological: He is alert and oriented to person, place, and time. He has normal reflexes. No cranial nerve deficit. Coordination normal.   Skin: Skin is warm and dry. No rash noted. No erythema.   Psychiatric: He has a normal mood and affect. His behavior is normal. Judgment and thought content normal.       Diagnoses and all orders for this visit:    Laceration of scalp, initial encounter wound has healed well Staples were removed without much difficulty wound care instructions have been given to the patient. He is up to date with Td booster. ER records reviewed discussed findings of no acute abnormality and CT of his brain with patient

## 2019-03-28 ENCOUNTER — TELEPHONE (OUTPATIENT)
Dept: INTERNAL MEDICINE | Facility: CLINIC | Age: 84
End: 2019-03-28

## 2019-03-28 RX ORDER — ACYCLOVIR 400 MG/1
400 TABLET ORAL 3 TIMES DAILY
Qty: 21 TABLET | Refills: 0 | Status: SHIPPED | OUTPATIENT
Start: 2019-03-28 | End: 2019-07-01

## 2019-07-01 ENCOUNTER — OFFICE VISIT (OUTPATIENT)
Dept: INTERNAL MEDICINE | Facility: CLINIC | Age: 84
End: 2019-07-01

## 2019-07-01 VITALS
TEMPERATURE: 97.4 F | SYSTOLIC BLOOD PRESSURE: 129 MMHG | HEIGHT: 71 IN | HEART RATE: 62 BPM | WEIGHT: 140 LBS | DIASTOLIC BLOOD PRESSURE: 69 MMHG | OXYGEN SATURATION: 99 % | BODY MASS INDEX: 19.6 KG/M2

## 2019-07-01 DIAGNOSIS — M21.371 RIGHT FOOT DROP: ICD-10-CM

## 2019-07-01 DIAGNOSIS — C32.9 LARYNGEAL CANCER (HCC): Primary | ICD-10-CM

## 2019-07-01 DIAGNOSIS — I10 ESSENTIAL HYPERTENSION: ICD-10-CM

## 2019-07-01 DIAGNOSIS — C61 MALIGNANT NEOPLASM OF PROSTATE (HCC): ICD-10-CM

## 2019-07-01 DIAGNOSIS — E78.2 MIXED HYPERLIPIDEMIA: ICD-10-CM

## 2019-07-01 PROCEDURE — G0439 PPPS, SUBSEQ VISIT: HCPCS | Performed by: INTERNAL MEDICINE

## 2019-07-01 PROCEDURE — 99397 PER PM REEVAL EST PAT 65+ YR: CPT | Performed by: INTERNAL MEDICINE

## 2019-07-01 NOTE — PROGRESS NOTES
QUICK REFERENCE INFORMATION:  The ABCs of the Annual Wellness Visit    Subsequent Medicare Wellness Visit    HEALTH RISK ASSESSMENT  84-year-old male with prostate and laryngeal CA he has, coronary artery disease currently angina free is coming in for wellness visit. He is accompanied by his wife was giving us some of the history.  Complains of occasional ataxia and uses a walker for mobility  1934    Recent Hospitalizations:  No hospitalization(s) within the last year..        Current Medical Providers:  Patient Care Team:  Vinayak Arenas MD as PCP - General  Shakeel Saavedra MD as Consulting Physician (Cardiology)        Smoking Status:  Social History     Tobacco Use   Smoking Status Former Smoker   • Last attempt to quit: 1981   • Years since quittin.0   Smokeless Tobacco Never Used       Alcohol Consumption:  Social History     Substance and Sexual Activity   Alcohol Use No       Depression Screen:   PHQ-2/PHQ-9 Depression Screening 2019   Little interest or pleasure in doing things 0   Feeling down, depressed, or hopeless 0   Total Score 0       Health Habits and Functional and Cognitive Screening:  Functional & Cognitive Status 2019   Do you have difficulty preparing food and eating? No   Do you have difficulty bathing yourself, getting dressed or grooming yourself? No   Do you have difficulty using the toilet? No   Do you have difficulty moving around from place to place? No   Do you have trouble with steps or getting out of a bed or a chair? No   In the past year have you fallen or experienced a near fall? Yes   Current Diet Well Balanced Diet   Dental Exam Up to date   Eye Exam Up to date   Exercise (times per week) 0 times per week   Current Exercise Activities Include No Regular Exercise   Do you need help using the phone?  No   Are you deaf or do you have serious difficulty hearing?  No   Do you need help with transportation? No   Do you need help shopping? No   Do you need help  preparing meals?  No   Do you need help with housework?  No   Do you need help with laundry? No   Do you need help taking your medications? No   Do you need help managing money? No   Do you ever drive or ride in a car without wearing a seat belt? No   Have you felt unusual stress, anger or loneliness in the last month? No   Who do you live with? Spouse   If you need help, do you have trouble finding someone available to you? No   Have you been bothered in the last four weeks by sexual problems? No   Do you have difficulty concentrating, remembering or making decisions? No           Does the patient have evidence of cognitive impairment? No    Aspirin use counseling: Does not need ASA but is currently taking (advised patient that ASA is not indicated and patient chooses to stop it)      Recent Lab Results:  CMP:  Lab Results   Component Value Date    GLU 82 02/01/2019    BUN 16 02/01/2019    CREATININE 1.10 02/01/2019    EGFRIFNONA 64 02/01/2019    EGFRIFAFRI 77 02/01/2019    BCR 14.5 02/01/2019     (L) 02/01/2019    K 4.7 02/01/2019    CO2 26.0 02/01/2019    CALCIUM 9.3 02/01/2019    PROTENTOTREF 7.5 02/01/2019    ALBUMIN 4.40 02/01/2019    LABGLOBREF 3.1 02/01/2019    LABIL2 1.4 02/01/2019    BILITOT 0.6 02/01/2019    ALKPHOS 52 02/01/2019    AST 22 02/01/2019    ALT 21 02/01/2019     Lipid Panel:  Lab Results   Component Value Date    TRIG 78 08/15/2014    HDL 38 (L) 08/15/2014     HbA1c:       Visual Acuity:  No exam data present    Age-appropriate Screening Schedule:  Refer to the list below for future screening recommendations based on patient's age, sex and/or medical conditions. Orders for these recommended tests are listed in the plan section. The patient has been provided with a written plan.    Health Maintenance   Topic Date Due   • ZOSTER VACCINE (2 of 2) 07/01/2029 (Originally 1/9/2012)   • INFLUENZA VACCINE  08/01/2019   • LIPID PANEL  02/01/2020   • TDAP/TD VACCINES (2 - Td) 05/17/2028   •  PNEUMOCOCCAL VACCINES (65+ LOW/MEDIUM RISK)  Completed        Subjective   History of Present Illness    Shane Nielson is a 85 y.o. male who presents for an Subsequent Wellness Visit.    The following portions of the patient's history were reviewed and updated as appropriate: allergies, current medications, past family history, past medical history, past social history, past surgical history and problem list.    Outpatient Medications Prior to Visit   Medication Sig Dispense Refill   • amLODIPine (NORVASC) 5 MG tablet TAKE 1 TABLET DAILY 90 tablet 3   • aspirin 81 MG tablet Take 1 tablet by mouth Daily. 100 tablet 2   • carvedilol (COREG) 12.5 MG tablet Take 2 tablets by mouth 2 (Two) Times a Day. 180 tablet 3   • montelukast (SINGULAIR) 10 MG tablet TAKE 1 TABLET DAILY 90 tablet 3   • acyclovir (ZOVIRAX) 400 MG tablet Take 1 tablet by mouth 3 (Three) Times a Day. Take no more than 5 doses a day. 21 tablet 0   • mometasone (NASONEX) 50 MCG/ACT nasal spray into each nostril.       No facility-administered medications prior to visit.        Patient Active Problem List   Diagnosis   • Mixed hyperlipidemia   • Essential hypertension   • Malignant neoplasm of prostate (CMS/HCC)   • Coronary arteriosclerosis in native artery   • Arthritis   • Edema   • Right foot drop   • Laryngeal cancer (CMS/HCC)       Advance Care Planning:  has an advance directive - a copy HAS NOT been provided    Identification of Risk Factors:  Risk factors include: increased fall risk, hearing limitations and polypharmacy.    Review of Systems   Constitutional: Negative.  Negative for activity change, appetite change, fatigue and fever.   HENT: Positive for congestion and rhinorrhea. Negative for ear discharge, ear pain, postnasal drip and trouble swallowing.    Eyes: Negative for photophobia and visual disturbance.   Respiratory: Negative for cough and shortness of breath.    Cardiovascular: Negative for chest pain and palpitations.    Gastrointestinal: Negative for abdominal distention, abdominal pain, constipation, diarrhea, nausea and vomiting.   Endocrine: Negative.    Genitourinary: Negative for dysuria, hematuria and urgency.        Nocturia   Musculoskeletal: Positive for arthralgias and gait problem. Negative for back pain, joint swelling and myalgias.   Skin: Negative for color change and rash.   Allergic/Immunologic: Negative.    Neurological: Negative for dizziness, weakness, light-headedness and headaches.        Rt foot drop   Hematological: Negative for adenopathy. Does not bruise/bleed easily.   Psychiatric/Behavioral: Negative for agitation, confusion, dysphoric mood and sleep disturbance. The patient is not nervous/anxious.        Compared to one year ago, the patient feels his physical health is the same.  Compared to one year ago, the patient feels his mental health is the same.    Objective     Physical Exam   Constitutional: He is oriented to person, place, and time. He appears well-developed and well-nourished. No distress.   HENT:   Nose: Nose normal.   Mouth/Throat: Oropharynx is clear and moist.   Eyes: Conjunctivae and EOM are normal. No scleral icterus.   Neck: No tracheal deviation present. No thyromegaly present.   Cardiovascular: Normal rate and regular rhythm. Exam reveals no friction rub.   No murmur heard.  Pulmonary/Chest: No respiratory distress. He has no wheezes. He has no rales.   Abdominal: Soft. He exhibits no distension and no mass. There is no tenderness. There is no guarding.   Musculoskeletal: Normal range of motion. He exhibits deformity.   Lymphadenopathy:     He has no cervical adenopathy.   Neurological: He is alert and oriented to person, place, and time. He has normal reflexes. No cranial nerve deficit. Coordination normal.   Rt foot drop   Skin: Skin is warm and dry. No rash noted. No erythema.   Psychiatric: He has a normal mood and affect. His behavior is normal. Judgment and thought content  "normal.       Vitals:    07/01/19 1052   BP: 129/69  Comment: Automatic   Pulse: 62   Temp: 97.4 °F (36.3 °C)   TempSrc: Temporal   SpO2: 99%   Weight: 63.5 kg (140 lb)   Height: 180.3 cm (71\")   PainSc: 0-No pain       Patient's Body mass index is 19.53 kg/m². BMI is below normal parameters. Recommendations include: discussed diet.      Assessment/Plan   Patient Self-Management and Personalized Health Advice  The patient has been provided with information about: fall prevention and preventive services including:   · Fall Risk assessment done, Fall Risk plan of care done, Nutrition counseling provided, Urinary Incontinence assessment done.    Visit Diagnoses:    ICD-10-CM ICD-9-CM          Laryngeal cancerStable symptom-free  C32.9 161.9    Malignant neoplasm of prostateStable recent PSA showed stable numbers he is following up with urology   C61 185    Coronary arteriosclerosis in native artery. Stable angina free  I25.10 414.01    Essential hypertension. Continue with the dietary restrictions and antihypertensive meds  I10 401.9    Mixed hyperlipidemia. Stable  E78.2 272.2    Right foot drop. Continue with orthotics  M21.371 736.79       No orders of the defined types were placed in this encounter.      Outpatient Encounter Medications as of 7/1/2019   Medication Sig Dispense Refill   • amLODIPine (NORVASC) 5 MG tablet TAKE 1 TABLET DAILY 90 tablet 3   • aspirin 81 MG tablet Take 1 tablet by mouth Daily. 100 tablet 2   • carvedilol (COREG) 12.5 MG tablet Take 2 tablets by mouth 2 (Two) Times a Day. 180 tablet 3   • montelukast (SINGULAIR) 10 MG tablet TAKE 1 TABLET DAILY 90 tablet 3                   No facility-administered encounter medications on file as of 7/1/2019.        Reviewed use of high risk medication in the elderly: yes  Reviewed for potential of harmful drug interactions in the elderly: yes    Follow Up:  No Follow-up on file.     An After Visit Summary and PPPS with all of these plans were given to " the patient.

## 2019-07-01 NOTE — PATIENT INSTRUCTIONS
Medicare Wellness  Personal Prevention Plan of Service     Date of Office Visit:  2019  Encounter Provider:  Vinayak Arenas MD  Place of Service:  [facility]  Patient Name: Shane Nielson  :  1934    As part of the Medicare Wellness portion of your visit today, we are providing you with this personalized preventive plan of services (PPPS). This plan is based upon recommendations of the [place] Preventive Services Task Force (USPSTF) and the Advisory Committee on Immunization Practices (ACIP).    This lists the preventive care services that should be considered, and provides dates of when you are due. Items listed as completed are up-to-date and do not require any further intervention.    Health Maintenance   Topic Date Due   • ZOSTER VACCINE (2 of 2) 2029 (Originally 2012)   • INFLUENZA VACCINE  2019   • LIPID PANEL  2020   • MEDICARE ANNUAL WELLNESS  2020   • TDAP/TD VACCINES (2 - Td) 2028   • PNEUMOCOCCAL VACCINES (65+ LOW/MEDIUM RISK)  Completed       Orders Placed This Encounter   Procedures   • Comprehensive Metabolic Panel   • LDL Cholesterol, Direct       No Follow-up on file.

## 2019-07-02 LAB
ALBUMIN SERPL-MCNC: 4.4 G/DL (ref 3.5–5)
ALBUMIN/GLOB SERPL: 1.3 G/DL (ref 1–2)
ALP SERPL-CCNC: 59 U/L (ref 38–126)
ALT SERPL-CCNC: 33 U/L (ref 13–69)
AST SERPL-CCNC: 26 U/L (ref 15–46)
BILIRUB SERPL-MCNC: 0.9 MG/DL (ref 0.2–1.3)
BUN SERPL-MCNC: 15 MG/DL (ref 7–20)
BUN/CREAT SERPL: 15 (ref 6.3–21.9)
CALCIUM SERPL-MCNC: 9.1 MG/DL (ref 8.4–10.2)
CHLORIDE SERPL-SCNC: 95 MMOL/L (ref 98–107)
CO2 SERPL-SCNC: 24 MMOL/L (ref 26–30)
CREAT SERPL-MCNC: 1 MG/DL (ref 0.6–1.3)
GLOBULIN SER CALC-MCNC: 3.3 GM/DL
GLUCOSE SERPL-MCNC: 83 MG/DL (ref 74–98)
LDLC SERPL DIRECT ASSAY-MCNC: 74 MG/DL (ref 0–99)
POTASSIUM SERPL-SCNC: 4.9 MMOL/L (ref 3.5–5.1)
PROT SERPL-MCNC: 7.7 G/DL (ref 6.3–8.2)
SODIUM SERPL-SCNC: 130 MMOL/L (ref 137–145)

## 2019-07-15 ENCOUNTER — TELEPHONE (OUTPATIENT)
Dept: INTERNAL MEDICINE | Facility: CLINIC | Age: 84
End: 2019-07-15

## 2019-07-15 NOTE — TELEPHONE ENCOUNTER
Patient received an email from [facility] stating that his simvastatin has been discontinued.  Please advise.  Phone number verified.

## 2019-08-06 ENCOUNTER — OFFICE VISIT (OUTPATIENT)
Dept: INTERNAL MEDICINE | Facility: CLINIC | Age: 84
End: 2019-08-06

## 2019-08-06 VITALS
HEART RATE: 60 BPM | WEIGHT: 134 LBS | DIASTOLIC BLOOD PRESSURE: 78 MMHG | HEIGHT: 71 IN | RESPIRATION RATE: 16 BRPM | OXYGEN SATURATION: 98 % | TEMPERATURE: 97.1 F | BODY MASS INDEX: 18.76 KG/M2 | SYSTOLIC BLOOD PRESSURE: 138 MMHG

## 2019-08-06 DIAGNOSIS — H61.22 IMPACTED CERUMEN OF LEFT EAR: ICD-10-CM

## 2019-08-06 DIAGNOSIS — H60.502 ACUTE OTITIS EXTERNA OF LEFT EAR, UNSPECIFIED TYPE: Primary | ICD-10-CM

## 2019-08-06 PROCEDURE — 69209 REMOVE IMPACTED EAR WAX UNI: CPT | Performed by: PHYSICIAN ASSISTANT

## 2019-08-06 PROCEDURE — 99213 OFFICE O/P EST LOW 20 MIN: CPT | Performed by: PHYSICIAN ASSISTANT

## 2019-08-06 NOTE — PROGRESS NOTES
Chief Complaint   Patient presents with   • Earache     Patient states he noticed some ear pain in his left ear, he states the pain comes and goes, and also has some trouble hearing out of it.       Subjective   Shane Nielson is a 85 y.o. male    History of Present Illness    Symptoms began Friday with symptoms of a stopped up left ear.  He reports that he has had decreased hearing on that side as well.  He has had this problem in the past.  He has had some infrequent pain in the ear as well.   No reports of fever, congestion, post nasal drip.      Past Medical History:   Diagnosis Date   • Allergic rhinitis    • Arthritis    • Asthma     AS CHILD   • Edema    • Foot drop, right foot    • Hypercholesterolemia    • Hypertension    • Laryngeal cancer (CMS/HCC)     vocal cords   • Prostate cancer (CMS/HCC)      Past Surgical History:   Procedure Laterality Date   • CARDIAC CATHETERIZATION      Dr. Mariusz Blanc at [facility] 70% EF  mild to moderate left main disease,mild proximal LAD disease. Medical Therapy recommended   • CATARACT EXTRACTION     • CIRCUMCISION      done at 32 years old   • EYE SURGERY Right      tear in the right/Repair Of Retinal Detachment   • HERNIA REPAIR      Inguinal Sliding- Dr. Negrete   • PROSTATE SURGERY       cancer w/ radiation   • THROAT SURGERY      vocal cord polyp removed 2x's  cancer w/ radiation  Dr. Olivares   • TONSILLECTOMY      With Adenoidectomy     Family History   Problem Relation Age of Onset   • Stroke Mother    • Cancer Father      Social History     Socioeconomic History   • Marital status:      Spouse name: Not on file   • Number of children: Not on file   • Years of education: Not on file   • Highest education level: Not on file   Tobacco Use   • Smoking status: Former Smoker     Last attempt to quit: 1981     Years since quittin.1   • Smokeless tobacco: Never Used   Substance and Sexual Activity   • Alcohol use: No   • Drug use: No     Allergies   Allergen  Reactions   • Grass          Review of Systems   Constitutional: Negative for activity change, appetite change, chills and fever.   HENT: Positive for ear pain (and fullness). Negative for congestion, postnasal drip, rhinorrhea, sore throat and trouble swallowing.    Eyes: Negative for itching.   Respiratory: Negative for cough.    Neurological: Negative for dizziness and light-headedness.       Objective     Vitals:    08/06/19 1400   BP: 138/78   Pulse: 60   Resp: 16   Temp: 97.1 °F (36.2 °C)   SpO2: 98%       Physical Exam   Constitutional: He is oriented to person, place, and time. He appears well-developed and well-nourished. No distress.   HENT:   Head: Normocephalic and atraumatic.   Right Ear: Hearing, tympanic membrane, external ear and ear canal normal.   Left Ear: External ear and ear canal normal. An impacted cerumen is present.  Some mild cerumen noted to the right canal, no impaction appreciated.     Eyes: Conjunctivae and EOM are normal. Pupils are equal, round, and reactive to light.   Neck: Normal range of motion. Neck supple.   Cardiovascular: Normal rate, regular rhythm and normal heart sounds. Exam reveals no gallop and no friction rub.   No murmur heard.  Pulmonary/Chest: Effort normal and breath sounds normal. No respiratory distress. He has no wheezes. He has no rales.   Lymphadenopathy:     He has no cervical adenopathy.   Neurological: He is alert and oriented to person, place, and time.   Skin: Skin is warm and dry. Capillary refill takes less than 2 seconds. He is not diaphoretic.   Psychiatric: He has a normal mood and affect. His behavior is normal.   Nursing note and vitals reviewed.  Ear Cerumen Removal  Date/Time: 8/6/2019 2:21 PM  Performed by: Tania Peres PA-C  Authorized by: Tania Peres PA-C   Consent: Verbal consent obtained. Written consent not obtained.  Risks and benefits: risks, benefits and alternatives were discussed  Consent given by: patient  Patient  understanding: patient states understanding of the procedure being performed  Patient consent: the patient's understanding of the procedure matches consent given  Procedure consent: procedure consent matches procedure scheduled    Anesthesia:  Local Anesthetic: none  Location details: left ear  Patient tolerance: Patient tolerated the procedure well with no immediate complications  Comments: TM visualized.  Canal appears red and edematous.  Procedure type: irrigation   Sedation:  Patient sedated: no          Assessment/Plan     Gene was seen today for earache.    Diagnoses and all orders for this visit:    Acute otitis externa of left ear, unspecified type  -     neomycin-polymyxin-hydrocortisone (CORTISPORIN) 3.5-01132-2 otic solution; Administer 3 drops into the left ear 3 (Three) Times a Day for 5 days.    Impacted cerumen of left ear  -     Ear Cerumen Removal    Patient reports symptoms improved post irrigation.  Canal on the left is quite edematous and erythematous.  Will start cortisporin drops for otitis externa.  Have advised him to RTC if symptoms are not fully resolved.  Patient and wife verbalize understanding and are in agreement.  Continue scheduled follow up with Dr. Arenas.     Return if symptoms worsen or fail to improve, for Next scheduled follow up.    Tania ePres PA-C

## 2019-08-07 ENCOUNTER — TELEPHONE (OUTPATIENT)
Dept: INTERNAL MEDICINE | Facility: CLINIC | Age: 84
End: 2019-08-07

## 2019-08-07 RX ORDER — CARVEDILOL 12.5 MG/1
12.5 TABLET ORAL 2 TIMES DAILY WITH MEALS
Qty: 60 TABLET | Refills: 5 | Status: SHIPPED | OUTPATIENT
Start: 2019-08-07 | End: 2019-10-25 | Stop reason: SDUPTHER

## 2019-08-07 NOTE — TELEPHONE ENCOUNTER
Patients wife Cherelle called regarding the medicine that she you talked about yesterday.  If you could give her a call when you get a chance.  Phone number verified.

## 2019-08-07 NOTE — TELEPHONE ENCOUNTER
During patient's office visit, patient and patient's wife stated he was no longer taking carvedilol 12.5 mg, but informed me he did have another medication he was taking and would call to clarify medication list when they got home.  Patient called back stating he is taking carvedilol 12.5mg twice daily.  Carvedilol 12.5mg has been added back to medication list.

## 2019-08-14 ENCOUNTER — OFFICE VISIT (OUTPATIENT)
Dept: INTERNAL MEDICINE | Facility: CLINIC | Age: 84
End: 2019-08-14

## 2019-08-14 VITALS
SYSTOLIC BLOOD PRESSURE: 118 MMHG | TEMPERATURE: 97.6 F | HEIGHT: 71 IN | WEIGHT: 131.6 LBS | BODY MASS INDEX: 18.42 KG/M2 | HEART RATE: 71 BPM | OXYGEN SATURATION: 98 % | DIASTOLIC BLOOD PRESSURE: 66 MMHG

## 2019-08-14 DIAGNOSIS — L08.9 SKIN PUSTULE: ICD-10-CM

## 2019-08-14 DIAGNOSIS — L03.119: Primary | ICD-10-CM

## 2019-08-14 DIAGNOSIS — R53.1 WEAKNESS GENERALIZED: ICD-10-CM

## 2019-08-14 DIAGNOSIS — L20.82 FLEXURAL ECZEMA: ICD-10-CM

## 2019-08-14 PROCEDURE — 99213 OFFICE O/P EST LOW 20 MIN: CPT | Performed by: NURSE PRACTITIONER

## 2019-08-14 RX ORDER — CEPHALEXIN 500 MG/1
500 CAPSULE ORAL 2 TIMES DAILY
Qty: 20 CAPSULE | Refills: 0 | Status: SHIPPED | OUTPATIENT
Start: 2019-08-14 | End: 2019-08-16

## 2019-08-14 NOTE — PROGRESS NOTES
Date: 2019    Name: Shane Nielson  : 1934    Chief Complaint:   Chief Complaint   Patient presents with   • Rash     on back and left arm       HPI: Mr. Nielson presents today with a rash.  He is in a wheelchair, accompanied by his wife and daughter.  He has 2 individual lesions, one on left shoulder blade, one on left forearm.  Lesion on left shoulder blade is painful.  Both lesions slightly itchy.  No new household or personal products in the last 2 months.  Denies fever, increased fatigue. Both lesions covered with occlusive bandages, neosporin applied daily.  Additionally, his wife reports Mr. Nielson has a place on his right ankle she is concerned about.  It is a rash that has been present for months.  It has not spread, he has had no previous skin irritation like this. It was initially itchy, very red.  Now is dry skin that won't go away.    Mrs. Nielson also reports she is concerned about Gene because he has fallen recently.  She feels his legs are becoming more weak, would like to find ways to increase his leg strength.  He has fallen recently.  They've installed grab bars in the shower, plan to purchase raised toilet seat with handles in the near future.      History:  The following portions of the patient's history were reviewed and updated as appropriate: allergies, current medications, past medical history, family history, surgical history, social history and problem list.     ROS:  Review of Systems   Constitutional: Positive for unexpected weight loss. Negative for appetite change.   Respiratory: Negative for cough, shortness of breath and wheezing.    Cardiovascular: Negative for chest pain and palpitations.   Skin: Negative for color change, pallor and bruise.   Neurological: Negative for dizziness and headache.   Hematological: Does not bruise/bleed easily.       VS:  Vitals:    19 1319 19 1456   BP: 118/66    Pulse: 71    Temp: 97.6 °F (36.4 °C)    TempSrc: Temporal    SpO2:  "98%    Weight: Comment: did not weigh 59.7 kg (131 lb 9.6 oz)   Height: 180.3 cm (71\")      Body mass index is 18.35 kg/m².    PE:  Physical Exam   Constitutional: He is oriented to person, place, and time. He appears cachectic.   HENT:   Head: Normocephalic.   Nose: Nose normal.   Mouth/Throat: Oropharynx is clear and moist.   Eyes: Conjunctivae are normal.   Cardiovascular: Normal rate, regular rhythm, normal heart sounds and intact distal pulses.   Pulmonary/Chest: Effort normal and breath sounds normal.   Musculoskeletal:   Bilateral legs equal in strength, very weak.  Decreased ROM due to stiffness of joints.     Neurological: He is alert and oriented to person, place, and time.   Skin: Capillary refill takes less than 2 seconds.   Pinpoint pustule with surrounding erythema left forearm; excoriated 2 cm secondary circular lesion on left scapula with 8 cm surrounding erythema, tenderness, warmth, unable to determine what primary lesion had been; dry scaly patch of skin at right anterior ankle flexure   Psychiatric: He has a normal mood and affect. His behavior is normal.       Assessment/Plan:  Shane was seen today for rash.    Diagnoses and all orders for this visit:    Cellulitis of scapular region  -     cephalexin (KEFLEX) 500 MG capsule; Take 1 capsule by mouth 2 (Two) Times a Day for 10 days.  -     mupirocin (BACTROBAN) 2 % ointment; Apply  topically to the appropriate area as directed 2 (Two) Times a Day for 5 days.  - Wash hands before and after applying ointment  - Monitor for fever, chills, malaise - return to clinic or go to ED if these develop  Skin pustule        - Wash hands after touching pustule        - Do not pop, squeeze pustule  Flexural eczema        - CeraVe or other emollient moisturizer daily        -  may use hydrocortisone for itching, if needed        -  evaluate shoes, bedding, chair to determine if friction irritation could be occurring   Weakness generalized         -  Advised to " use pedal exerciser 5 to 10 minutes a day, increase as tolerated         - Mrs. Nielson states they received some handouts for generalized strengthening exercises recently.  She will initiate and encourage Mr. Nielson to do these daily      Return if symptoms worsen or fail to improve.

## 2019-08-15 ENCOUNTER — TELEPHONE (OUTPATIENT)
Dept: INTERNAL MEDICINE | Facility: CLINIC | Age: 84
End: 2019-08-15

## 2019-08-16 ENCOUNTER — OFFICE VISIT (OUTPATIENT)
Dept: INTERNAL MEDICINE | Facility: CLINIC | Age: 84
End: 2019-08-16

## 2019-08-16 ENCOUNTER — TELEPHONE (OUTPATIENT)
Dept: INTERNAL MEDICINE | Facility: CLINIC | Age: 84
End: 2019-08-16

## 2019-08-16 ENCOUNTER — OFFICE VISIT (OUTPATIENT)
Dept: SURGERY | Facility: CLINIC | Age: 84
End: 2019-08-16

## 2019-08-16 VITALS
SYSTOLIC BLOOD PRESSURE: 118 MMHG | BODY MASS INDEX: 18.34 KG/M2 | HEART RATE: 74 BPM | OXYGEN SATURATION: 96 % | DIASTOLIC BLOOD PRESSURE: 58 MMHG | WEIGHT: 131 LBS | HEIGHT: 71 IN | TEMPERATURE: 97.1 F

## 2019-08-16 VITALS
SYSTOLIC BLOOD PRESSURE: 118 MMHG | OXYGEN SATURATION: 99 % | TEMPERATURE: 98.2 F | HEART RATE: 63 BPM | WEIGHT: 131 LBS | BODY MASS INDEX: 18.34 KG/M2 | DIASTOLIC BLOOD PRESSURE: 57 MMHG | HEIGHT: 71 IN

## 2019-08-16 DIAGNOSIS — L08.9 INFECTED SEBACEOUS CYST: Primary | ICD-10-CM

## 2019-08-16 DIAGNOSIS — L02.414 CUTANEOUS ABSCESS OF LEFT UPPER EXTREMITY: Primary | ICD-10-CM

## 2019-08-16 DIAGNOSIS — L72.3 INFECTED SEBACEOUS CYST: Primary | ICD-10-CM

## 2019-08-16 PROCEDURE — 99213 OFFICE O/P EST LOW 20 MIN: CPT | Performed by: INTERNAL MEDICINE

## 2019-08-16 PROCEDURE — 10061 I&D ABSCESS COMP/MULTIPLE: CPT | Performed by: SURGERY

## 2019-08-16 PROCEDURE — 99202 OFFICE O/P NEW SF 15 MIN: CPT | Performed by: SURGERY

## 2019-08-16 RX ORDER — DOXYCYCLINE HYCLATE 100 MG/1
100 CAPSULE ORAL 2 TIMES DAILY
Qty: 20 CAPSULE | Refills: 0 | Status: SHIPPED | OUTPATIENT
Start: 2019-08-16 | End: 2019-09-04

## 2019-08-16 RX ORDER — MONTELUKAST SODIUM 10 MG/1
10 TABLET ORAL NIGHTLY
COMMUNITY
End: 2019-10-27 | Stop reason: SDUPTHER

## 2019-08-16 NOTE — TELEPHONE ENCOUNTER
Spoke with pt wife and she said that spot on arm and back are very red and they are concerned they put heat packs on them yesterday and they helped. They are wanting to see dermatology said they would call the office and see if he needs a referral.

## 2019-08-16 NOTE — PROGRESS NOTES
Patient: Shane Nielson    YOB: 1934    Date: 08/16/2019    Primary Care Provider: Vinayak Arenas MD    Chief Complaint   Patient presents with   • Skin Lesion       SUBJECTIVE:    History of present illness: Patient is in the office today for evaluation and consultation of skin lesion. He has 2 individual lesions, one on left shoulder blade, one on left forearm.  Lesion on left shoulder blade is painful.  Both lesions slightly itchy.  No new household or personal products in the last 2 months.  Denies fever, increased fatigue. Both lesions covered with occlusive bandages, neosporin applied daily.  No improvement with Keflex, recently changed antibiotics yesterday.  Cultures are pending.    The following portions of the patient's history were reviewed and updated as appropriate: allergies, current medications, past family history, past medical history, past social history, past surgical history and problem list.       Review of Systems    Allergies:  Allergies   Allergen Reactions   • Grass        Medications:    Current Outpatient Medications:   •  amLODIPine (NORVASC) 5 MG tablet, TAKE 1 TABLET DAILY (Patient taking differently: TAKE 1 TABLET by mouth 2 tiems DAILY), Disp: 90 tablet, Rfl: 3  •  carvedilol (COREG) 12.5 MG tablet, Take 1 tablet by mouth 2 (Two) Times a Day With Meals., Disp: 60 tablet, Rfl: 5  •  doxycycline (VIBRAMYCIN) 100 MG capsule, Take 1 capsule by mouth 2 (Two) Times a Day., Disp: 20 capsule, Rfl: 0  •  montelukast (SINGULAIR) 10 MG tablet, Take 10 mg by mouth Every Night., Disp: , Rfl:   •  mupirocin (BACTROBAN) 2 % ointment, Apply  topically to the appropriate area as directed 2 (Two) Times a Day for 5 days., Disp: 22 g, Rfl: 0    History:  Past Medical History:   Diagnosis Date   • Allergic rhinitis    • Arthritis    • Asthma     AS CHILD   • Edema    • Foot drop, right foot    • Hypercholesterolemia    • Hypertension    • Laryngeal cancer (CMS/HCC)     vocal cords   •  "Prostate cancer (CMS/HCC)        Past Surgical History:   Procedure Laterality Date   • CARDIAC CATHETERIZATION      Dr. Mariusz Blanc at PAC 70% EF  mild to moderate left main disease,mild proximal LAD disease. Medical Therapy recommended   • CATARACT EXTRACTION     • CIRCUMCISION      done at 32 years old   • EYE SURGERY Right      tear in the right/Repair Of Retinal Detachment   • HERNIA REPAIR      Inguinal Sliding- Dr. Negrete   • PROSTATE SURGERY       cancer w/ radiation   • THROAT SURGERY      vocal cord polyp removed 2x's  cancer w/ radiation  Dr. Olivares   • TONSILLECTOMY      With Adenoidectomy       Family History   Problem Relation Age of Onset   • Stroke Mother    • Cancer Father        Social History     Tobacco Use   • Smoking status: Former Smoker     Last attempt to quit: 1981     Years since quittin.2   • Smokeless tobacco: Never Used   Substance Use Topics   • Alcohol use: No   • Drug use: No        OBJECTIVE:    Vital Signs:   Vitals:    19 1330   BP: 118/58   Pulse: 74   Temp: 97.1 °F (36.2 °C)   SpO2: 96%   Weight: 59.4 kg (131 lb)   Height: 180.3 cm (71\")       Physical Exam:   General Appearance:    Alert, cooperative, in no acute distress   Head:    Normocephalic, without obvious abnormality, atraumatic   Eyes:            Lids and lashes normal, conjunctivae and sclerae normal, no   icterus, no pallor, corneas clear, PERRLA   Ears:    Ears appear intact with no abnormalities noted   Throat:   No oral lesions, no thrush, oral mucosa moist   Neck:   No adenopathy, supple, trachea midline, no thyromegaly, no   carotid bruit, no JVD   Lungs:     Clear to auscultation,respirations regular, even and                  unlabored    Heart:    Regular rhythm and normal rate, normal S1 and S2, no            murmur, no gallop, no rub, no click   Chest Wall:    No abnormalities observed   Abdomen:     Normal bowel sounds, no masses, no organomegaly, soft        non-tender, non-distended, no " guarding, no rebound                tenderness   Extremities:   Moves all extremities well, no edema, no cyanosis, no             Redness, abscess left upper forearm, firm and red with redness.  Left upper back abscess.   Pulses:   Pulses palpable and equal bilaterally   Skin:   No bleeding, bruising or rash   Lymph nodes:   No palpable adenopathy   Neurologic:   Cranial nerves 2 - 12 grossly intact, sensation intact, DTR       present and equal bilaterally     Results Review:   I reviewed the patient's new clinical results.    ASSESSMENT/PLAN:    1. Cutaneous abscess of left upper extremity        Procedure: Incision and drainage abscess, complex, left upper back and left forearm    I recommended abscess drainage to the patient. I explained the indication as well as the risks and benefits which include bleeding, further infection requiring additional procedures, non healing of the wound etc. The patient understands these and wishes to proceed.      The patient was brought to the procedure room. Consent and time out were performed. The area was prepped and draped in the usual fashion. 1% lidocaine with epinephrine was infused locally. The abscess was then incised and drained sharply with a #11 blade. Purulent contents were evacuated and irrigated with saline and peroxide. Minimal blood loss had occurred and was well controlled with pressure.  2 abscess were drained, one on the upper shoulder and one on the left forearm.  Wall both were packed with gauze.  There were no complications and the patient tolerated the procedure well. Wound instructions were given.    I discussed the patients findings and my recommendations with patient    Review of Systems was reviewed and confirmed as accurate today.    Electronically signed by Manasa Negrete MD  08/16/19      .

## 2019-08-16 NOTE — TELEPHONE ENCOUNTER
Spoke with Mrs Nielson - she states area on left forearm is swollen and tender to touch, lesion on left scapula is draining clear fluid & had 2 white spots on it last night.  Both sites feel better after heating pad and warm shower.  She will call after her daughter, who is a nurse, looks at his skin & decide next step in treatment.  She does not want to bring him to clinic today, if not necessary.

## 2019-08-16 NOTE — TELEPHONE ENCOUNTER
Patient's daughter (Padmaja on [address]) called and stated that Sharon had given her cell phone number so that the patient could send her pictures of a wound (08/15/19 encounter says spider bite). Patient's wife wrote down an incorrect number, and so they are unable to send the pictures.    Called to see if they could grab that phone number.    Please call and advise.

## 2019-08-16 NOTE — PROGRESS NOTES
"Pernell Nielson is a 85 y.o. male    HPI coming in accompanied by his wife and daughter has developed 2 areas of swelling within duration one on his left forearm the other on his back no associated fever or chills he was evaluated yesterday and placed on Keflex however this is given him diarrhea.  He has multiple medical problems including oropharyngeal CA in remission, prostate cancer.    The following portions of the patient's history were reviewed and updated as appropriate: allergies, current medications, past family history, past medical history, past social history, past surgical history, and problem list.     Review of Systems   Constitutional: Negative.  Negative for activity change, appetite change, fatigue and fever.   HENT: Negative for congestion, ear discharge, ear pain and trouble swallowing.    Eyes: Negative for photophobia and visual disturbance.   Respiratory: Negative for cough and shortness of breath.    Cardiovascular: Negative for chest pain and palpitations.   Gastrointestinal: Negative for abdominal distention, abdominal pain, constipation, diarrhea, nausea and vomiting.   Endocrine: Negative.    Genitourinary: Negative for dysuria, hematuria and urgency.   Musculoskeletal: Positive for arthralgias. Negative for back pain, joint swelling and myalgias.   Skin: Positive for wound. Negative for color change and rash.   Allergic/Immunologic: Negative.    Neurological: Negative for dizziness, weakness, light-headedness and headaches.   Hematological: Negative for adenopathy. Does not bruise/bleed easily.   Psychiatric/Behavioral: Negative for agitation, confusion and dysphoric mood. The patient is not nervous/anxious.        Visit Vitals  /57 Comment: Automatic   Pulse 63   Temp 98.2 °F (36.8 °C) (Temporal)   Ht 180.3 cm (71\")   Wt 59.4 kg (131 lb)   SpO2 99%   BMI 18.27 kg/m²       Objective  Physical Exam   Constitutional: He is oriented to person, place, and time. He appears " well-developed and well-nourished. No distress.   HENT:   Nose: Nose normal.   Mouth/Throat: Oropharynx is clear and moist.   Eyes: Conjunctivae and EOM are normal. No scleral icterus.   Neck: No tracheal deviation present. No thyromegaly present.   Cardiovascular: Normal rate and regular rhythm. Exam reveals no friction rub.   No murmur heard.  Pulmonary/Chest: No respiratory distress. He has no wheezes. He has no rales.   Abdominal: Soft. He exhibits no distension and no mass. There is no tenderness. There is no guarding.   Musculoskeletal: Normal range of motion. He exhibits no deformity.   Lymphadenopathy:     He has no cervical adenopathy.   Neurological: He is alert and oriented to person, place, and time. He has normal reflexes. No cranial nerve deficit. Coordination normal.   Foot drop   Skin: Skin is warm and dry. No rash noted. No erythema.        Indurated erythematous lesion with minimal tenderness    Psychiatric: He has a normal mood and affect. His behavior is normal. Judgment and thought content normal.       Diagnoses and all orders for this visit:    Infected sebaceous cyst attempted drainage with minimal results.  Swab sent off for culture.  Placed on doxycycline continue with Bactroban.  Referral to surgery.  Call placed to Dr. Negrete who will be seeing  him today for incision and drainage  -     Ambulatory Referral to General Surgery  -     Anaerobic Culture - Swab, Arm, Left  -     Culture, Routine - Swab, Arm, Left    Other orders  -     montelukast (SINGULAIR) 10 MG tablet; Take 10 mg by mouth Every Night.  -     doxycycline (VIBRAMYCIN) 100 MG capsule; Take 1 capsule by mouth 2 (Two) Times a Day.

## 2019-08-19 ENCOUNTER — TELEPHONE (OUTPATIENT)
Dept: INTERNAL MEDICINE | Facility: CLINIC | Age: 84
End: 2019-08-19

## 2019-08-19 DIAGNOSIS — C32.9 LARYNGEAL CANCER (HCC): Primary | ICD-10-CM

## 2019-08-19 DIAGNOSIS — M21.371 RIGHT FOOT DROP: ICD-10-CM

## 2019-08-19 DIAGNOSIS — M19.90 ARTHRITIS: ICD-10-CM

## 2019-08-19 NOTE — TELEPHONE ENCOUNTER
Patients daughter called requesting if a wheelchair can be ordered for her father. She states he is having difficulty walking and is a lot weaker now. She did not specify an company. Daughter also requested lab results. Please advice.

## 2019-08-20 LAB
BACTERIA SPEC AEROBE CULT: ABNORMAL
BACTERIA SPEC ANAEROBE CULT: NORMAL
BACTERIA SPEC CULT: ABNORMAL
BACTERIA SPEC CULT: NORMAL
OTHER ANTIBIOTIC SUSC ISLT: ABNORMAL

## 2019-08-20 NOTE — TELEPHONE ENCOUNTER
Patient's daughter called for an update. I informed her we were waiting on the lab results and that Dr. Arenas was aware of this situation.    Daughter requested that we send the script for the wheelchair to [facility] in [place]. The pharmacy has a wheelchair ready to go as soon as the script is sent in.    Patient's daughter would like a quick phone call to inform her when this has been done.    Please advise.

## 2019-08-23 ENCOUNTER — OFFICE VISIT (OUTPATIENT)
Dept: SURGERY | Facility: CLINIC | Age: 84
End: 2019-08-23

## 2019-08-23 VITALS
TEMPERATURE: 98 F | SYSTOLIC BLOOD PRESSURE: 120 MMHG | OXYGEN SATURATION: 97 % | DIASTOLIC BLOOD PRESSURE: 68 MMHG | WEIGHT: 131.2 LBS | HEIGHT: 71 IN | HEART RATE: 78 BPM | BODY MASS INDEX: 18.37 KG/M2

## 2019-08-23 DIAGNOSIS — Z48.89 POSTOPERATIVE VISIT: Primary | ICD-10-CM

## 2019-08-23 PROCEDURE — 99024 POSTOP FOLLOW-UP VISIT: CPT | Performed by: SURGERY

## 2019-08-23 NOTE — PROGRESS NOTES
"Patient: Shane Nielson    YOB: 1934    Date: 08/23/2019    Primary Care Provider: Vinayak Arenas MD    Chief Complaint   Patient presents with   • Follow-up       History: I saw the patient in the office today following up after an I&D @ left posterior shoulder and left arm. Patient states the drainage snfd redness has improved, denies pain.      The following portions of the patient's history were reviewed and updated as appropriate: allergies, current medications, past family history, past medical history, past social history, past surgical history and problem list.      Review of Systems   Constitutional: Negative for chills, fever and unexpected weight change.   HENT: Negative for trouble swallowing and voice change.    Eyes: Negative for visual disturbance.   Respiratory: Negative for apnea, cough, chest tightness, shortness of breath and wheezing.    Cardiovascular: Negative for chest pain, palpitations and leg swelling.   Gastrointestinal: Negative for abdominal distention, abdominal pain, anal bleeding, blood in stool, constipation, diarrhea, nausea, rectal pain and vomiting.   Endocrine: Negative for cold intolerance and heat intolerance.   Genitourinary: Negative for difficulty urinating, dysuria, flank pain, scrotal swelling and testicular pain.   Musculoskeletal: Negative for back pain, gait problem and joint swelling.   Skin: Negative for color change, rash and wound.   Neurological: Negative for dizziness, syncope, speech difficulty, weakness, numbness and headaches.   Hematological: Negative for adenopathy. Does not bruise/bleed easily.   Psychiatric/Behavioral: Negative for confusion. The patient is not nervous/anxious.        Vital Signs  Vitals:    08/23/19 1254   BP: 120/68   Pulse: 78   Temp: 98 °F (36.7 °C)   SpO2: 97%   Weight: 59.5 kg (131 lb 3.2 oz)   Height: 180.3 cm (71\")       Allergies:  Allergies   Allergen Reactions   • Grass        Medications:    Current Outpatient " Medications:   •  amLODIPine (NORVASC) 5 MG tablet, TAKE 1 TABLET DAILY (Patient taking differently: TAKE 1 TABLET by mouth 2 tiems DAILY), Disp: 90 tablet, Rfl: 3  •  carvedilol (COREG) 12.5 MG tablet, Take 1 tablet by mouth 2 (Two) Times a Day With Meals., Disp: 60 tablet, Rfl: 5  •  doxycycline (VIBRAMYCIN) 100 MG capsule, Take 1 capsule by mouth 2 (Two) Times a Day., Disp: 20 capsule, Rfl: 0  •  montelukast (SINGULAIR) 10 MG tablet, Take 10 mg by mouth Every Night., Disp: , Rfl:     Physical Exam:   General Appearance:    Alert, cooperative, in no acute distress   Head:    Normocephalic, without obvious abnormality, atraumatic   Lungs:     Clear to auscultation,respirations regular, even and                  unlabored    Heart:    Regular rhythm and normal rate, normal S1 and S2, no            murmur, no gallop, no rub, no click   Abdomen:     Normal bowel sounds, no masses, no organomegaly, soft        non-tender, non-distended, no guarding, no rebound                tenderness   Extremities:   Moves all extremities well, no edema, no cyanosis, no             redness   Pulses:   Pulses palpable and equal bilaterally   Skin:   No bleeding, bruising or rash       Results Review:   I reviewed the patient's new clinical results.     ASSESSMENT/PLAN:    1. Postoperative visit        Electronically signed by Manasa Negrete MD  08/23/19    Portions of this note have been scribed for Manasa Negrete MD by Gini Herrera. 8/23/2019  1:07 PM

## 2019-09-04 ENCOUNTER — OFFICE VISIT (OUTPATIENT)
Dept: CARDIOLOGY | Facility: CLINIC | Age: 84
End: 2019-09-04

## 2019-09-04 VITALS
SYSTOLIC BLOOD PRESSURE: 122 MMHG | WEIGHT: 130 LBS | OXYGEN SATURATION: 98 % | BODY MASS INDEX: 18.2 KG/M2 | HEART RATE: 56 BPM | HEIGHT: 71 IN | DIASTOLIC BLOOD PRESSURE: 68 MMHG

## 2019-09-04 DIAGNOSIS — I25.10 CORONARY ARTERIOSCLEROSIS IN NATIVE ARTERY: Primary | ICD-10-CM

## 2019-09-04 DIAGNOSIS — I10 ESSENTIAL HYPERTENSION: ICD-10-CM

## 2019-09-04 DIAGNOSIS — E78.2 MIXED HYPERLIPIDEMIA: ICD-10-CM

## 2019-09-04 PROCEDURE — 99213 OFFICE O/P EST LOW 20 MIN: CPT | Performed by: INTERNAL MEDICINE

## 2019-09-04 NOTE — PROGRESS NOTES
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 CAD  1. 1999 C nonobst  2. HTN  3. HL   1. 2016 LDL 24  2. 7/1/2019 LDL 74  4. 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 XRT 2000  6. R foot drop- brace ([place])  7. Prostate CA XRT ([place])  8. L forearm and shoulder abscess I&D [facility]    Chief Complaint   Patient presents with   • Coronary Artery Disease       Allergies  Allergies   Allergen Reactions   • Grass        Current Medications    Current Outpatient Medications:   •  amLODIPine (NORVASC) 5 MG tablet, TAKE 1 TABLET DAILY (Patient taking differently: TAKE 1 TABLET by mouth DAILY), Disp: 90 tablet, Rfl: 3  •  carvedilol (COREG) 12.5 MG tablet, Take 1 tablet by mouth 2 (Two) Times a Day With Meals., Disp: 60 tablet, Rfl: 5  •  montelukast (SINGULAIR) 10 MG tablet, Take 10 mg by mouth Every Night., Disp: , Rfl:       History of Present Illness   Shane Nielson is a 85 y.o. year old male here for follow up.  Had 2 abscesses w cellulitis requiring abx and I&D w Dr. Negrete last month, some weakness w that resulting in 2 falls. His weight is down 20 lbs, states he has a good appetite. BP is stable 120s/70s at home. Some fatigue.  Patient is accompanied by family members in the office today      OBJECTIVE:  Vitals:    09/04/19 1035   BP: 122/68   BP Location: Right arm   Patient Position: Sitting   Pulse: 56   SpO2: 98%   Weight: 59 kg (130 lb)   Height: 179.1 cm (70.5\")     Physical Exam   Constitutional: He is oriented to person, place, and time. He appears well-developed and well-nourished. No distress.   Cardiovascular: Normal rate, regular rhythm, normal heart sounds and intact distal pulses.   No murmur heard.  Pulses:       Radial pulses are 2+ on the right side, and 2+ on the left side. "        Dorsalis pedis pulses are 2+ on the right side, and 2+ on the left side.        Posterior tibial pulses are 2+ on the right side, and 2+ on the left side.   Pulmonary/Chest: Effort normal and breath sounds normal. He has no wheezes. He has no rales.   Abdominal: Soft. Bowel sounds are normal. There is no tenderness. There is no guarding.   Musculoskeletal: He exhibits edema. He exhibits no tenderness.   In wheelchair   Neurological: He is alert and oriented to person, place, and time.   Skin: Skin is warm and dry. No rash noted.   Psychiatric: He has a normal mood and affect.   Nursing note and vitals reviewed.      Diagnostic Data:  Procedures      ASSESSMENT:   Diagnosis Plan   1. Coronary arteriosclerosis in native artery     2. Essential hypertension     3. Mixed hyperlipidemia         PLAN:  1. Patient is stable with no anginal equivalent symptoms. Continue medical management.   2. Patient has acceptable BP. Continue current medical management. Counseled to regularly check BP at home with goal averaging <130/80.   3. Diet controlled, labs per pcp  4. Weight loss- pt counseled to try supplemental shakes to get calories in. Rec to f/u w PCP for updated labs.    Vinayak Arenas MD, thank you for referring Mr. Nielson for evaluation.  I have forwarded my electronically generated recommendations to you for review.  Please do not hesitate to call with any questions.    Scribed for Shakeel Saavedra MD by Tatyana Hassan PA-C. 9/4/2019  11:53 AM   Shakeel Saavedra MD, [facility]  I, Shakeel Saavedra MD, personally performed the services described in this documentation as scribed by the above named individual in my presence, and it is both accurate and complete.  9/4/2019  11:53 AM

## 2019-10-04 DIAGNOSIS — L03.119: ICD-10-CM

## 2019-10-10 ENCOUNTER — OFFICE VISIT (OUTPATIENT)
Dept: INTERNAL MEDICINE | Facility: CLINIC | Age: 84
End: 2019-10-10

## 2019-10-10 VITALS
HEIGHT: 71 IN | OXYGEN SATURATION: 100 % | HEART RATE: 57 BPM | TEMPERATURE: 97 F | SYSTOLIC BLOOD PRESSURE: 144 MMHG | WEIGHT: 132.8 LBS | DIASTOLIC BLOOD PRESSURE: 62 MMHG | BODY MASS INDEX: 18.59 KG/M2

## 2019-10-10 DIAGNOSIS — R21 RASH: Primary | ICD-10-CM

## 2019-10-10 PROCEDURE — 99213 OFFICE O/P EST LOW 20 MIN: CPT | Performed by: INTERNAL MEDICINE

## 2019-10-10 RX ORDER — PRENATAL VIT 91/IRON/FOLIC/DHA 28-975-200
COMBINATION PACKAGE (EA) ORAL 2 TIMES DAILY
Qty: 30 G | Refills: 1 | Status: SHIPPED | OUTPATIENT
Start: 2019-10-10 | End: 2020-01-10

## 2019-10-10 NOTE — PROGRESS NOTES
"Pernell Nielson is a 85 y.o. male    HPI here today for a bilateral leg rash present x 3 months. The rash is not painful or pruritic currently. Started on the right ankle and has spread up to the knee and left shin. At first the rash looked like thickened pink skin but has now turned excoriated and red. Has never had this type of rash before. Denies any new laundry detergent or soap. Has been outside more but has not mowed the grass or been around any plants. Does not have animals that live inside the home.      The following portions of the patient's history were reviewed and updated as appropriate: allergies, current medications, past family history, past medical history, past social history, past surgical history, and problem list.     Review of Systems   Constitutional: Negative for appetite change, fatigue and fever.   HENT: Negative for trouble swallowing.    Eyes: Negative for visual disturbance.   Respiratory: Negative for cough, shortness of breath and wheezing.    Cardiovascular: Negative for chest pain, palpitations and leg swelling.   Gastrointestinal: Positive for constipation. Negative for abdominal pain, blood in stool, diarrhea and nausea.   Genitourinary: Negative for difficulty urinating.   Musculoskeletal: Positive for joint swelling. Negative for arthralgias and back pain.   Skin: Positive for rash.   Neurological: Negative for weakness, light-headedness and headaches.   Psychiatric/Behavioral: Negative for dysphoric mood and sleep disturbance. The patient is not nervous/anxious.        Visit Vitals  /62 Comment: Automatic   Pulse 57   Temp 97 °F (36.1 °C) (Temporal)   Ht 179.1 cm (70.5\")   Wt 60.2 kg (132 lb 12.8 oz)   SpO2 100%   BMI 18.79 kg/m²       Objective  Physical Exam   Constitutional: He is oriented to person, place, and time. He appears well-developed and well-nourished. No distress.   Eyes: Pupils are equal, round, and reactive to light.   Neck: Normal range of motion. "   Cardiovascular: Normal rate, regular rhythm, normal heart sounds and intact distal pulses.   No murmur heard.  Pulmonary/Chest: Effort normal and breath sounds normal. No respiratory distress. He has no wheezes.   Abdominal: Soft. Bowel sounds are normal. He exhibits no distension. There is no tenderness.   Musculoskeletal: Normal range of motion. He exhibits edema and deformity. He exhibits no tenderness.   Neurological: He is alert and oriented to person, place, and time.   Skin: Skin is warm and dry. Capillary refill takes less than 2 seconds. Rash noted. There is erythema.        Psychiatric: He has a normal mood and affect. His behavior is normal. Judgment and thought content normal.   Nursing note and vitals reviewed.      Diagnoses and all orders for this visit:    Rash  - Suspect fungal in nature. Lamisil cream prescribed. Instructed to call office in 2 weeks for update on progress. Encouraged to clean with soap and water, pat try, and then apply Lamisil cream.     Other orders  -     terbinafine (LAMISIL AT) 1 % cream; Apply  topically to the appropriate area as directed 2 (Two) Times a Day.    1.  This note accurately reflects work and decisions made by me.

## 2019-10-25 RX ORDER — CARVEDILOL 12.5 MG/1
TABLET ORAL
Qty: 180 TABLET | Refills: 8 | Status: SHIPPED | OUTPATIENT
Start: 2019-10-25 | End: 2019-10-25 | Stop reason: SDUPTHER

## 2019-10-25 RX ORDER — CARVEDILOL 12.5 MG/1
25 TABLET ORAL 2 TIMES DAILY
Qty: 120 TABLET | Refills: 0 | Status: SHIPPED | OUTPATIENT
Start: 2019-10-25 | End: 2020-11-05

## 2019-10-28 ENCOUNTER — OFFICE VISIT (OUTPATIENT)
Dept: INTERNAL MEDICINE | Facility: CLINIC | Age: 84
End: 2019-10-28

## 2019-10-28 VITALS
SYSTOLIC BLOOD PRESSURE: 100 MMHG | BODY MASS INDEX: 18.2 KG/M2 | HEART RATE: 58 BPM | DIASTOLIC BLOOD PRESSURE: 52 MMHG | OXYGEN SATURATION: 97 % | WEIGHT: 130 LBS | TEMPERATURE: 97.6 F | HEIGHT: 71 IN

## 2019-10-28 DIAGNOSIS — Z13.228 SCREENING FOR ENDOCRINE, METABOLIC AND IMMUNITY DISORDER: ICD-10-CM

## 2019-10-28 DIAGNOSIS — Z13.0 SCREENING FOR ENDOCRINE, METABOLIC AND IMMUNITY DISORDER: ICD-10-CM

## 2019-10-28 DIAGNOSIS — Z13.0 SCREENING FOR DISORDER OF BLOOD AND BLOOD-FORMING ORGANS: ICD-10-CM

## 2019-10-28 DIAGNOSIS — R21 RASH: Primary | ICD-10-CM

## 2019-10-28 DIAGNOSIS — Z13.29 SCREENING FOR ENDOCRINE, METABOLIC AND IMMUNITY DISORDER: ICD-10-CM

## 2019-10-28 LAB
ALBUMIN SERPL-MCNC: 4.5 G/DL (ref 3.5–5.2)
ALBUMIN/GLOB SERPL: 1.7 G/DL
ALP SERPL-CCNC: 56 U/L (ref 39–117)
ALT SERPL-CCNC: 11 U/L (ref 1–41)
AST SERPL-CCNC: 16 U/L (ref 1–40)
BASOPHILS # BLD AUTO: 0.04 10*3/MM3 (ref 0–0.2)
BASOPHILS NFR BLD AUTO: 1 % (ref 0–1.5)
BILIRUB SERPL-MCNC: 0.6 MG/DL (ref 0.2–1.2)
BUN SERPL-MCNC: 12 MG/DL (ref 8–23)
BUN/CREAT SERPL: 10.7 (ref 7–25)
CALCIUM SERPL-MCNC: 9.2 MG/DL (ref 8.6–10.5)
CHLORIDE SERPL-SCNC: 95 MMOL/L (ref 98–107)
CO2 SERPL-SCNC: 21.8 MMOL/L (ref 22–29)
CREAT SERPL-MCNC: 1.12 MG/DL (ref 0.76–1.27)
EOSINOPHIL # BLD AUTO: 0.24 10*3/MM3 (ref 0–0.4)
EOSINOPHIL NFR BLD AUTO: 6.1 % (ref 0.3–6.2)
ERYTHROCYTE [DISTWIDTH] IN BLOOD BY AUTOMATED COUNT: 13.5 % (ref 12.3–15.4)
GLOBULIN SER CALC-MCNC: 2.6 GM/DL
GLUCOSE SERPL-MCNC: 122 MG/DL (ref 65–99)
HCT VFR BLD AUTO: 31.4 % (ref 37.5–51)
HGB BLD-MCNC: 10.8 G/DL (ref 13–17.7)
IMM GRANULOCYTES # BLD AUTO: 0.01 10*3/MM3 (ref 0–0.05)
IMM GRANULOCYTES NFR BLD AUTO: 0.3 % (ref 0–0.5)
LYMPHOCYTES # BLD AUTO: 0.83 10*3/MM3 (ref 0.7–3.1)
LYMPHOCYTES NFR BLD AUTO: 21 % (ref 19.6–45.3)
MCH RBC QN AUTO: 32.7 PG (ref 26.6–33)
MCHC RBC AUTO-ENTMCNC: 34.4 G/DL (ref 31.5–35.7)
MCV RBC AUTO: 95.2 FL (ref 79–97)
MONOCYTES # BLD AUTO: 0.58 10*3/MM3 (ref 0.1–0.9)
MONOCYTES NFR BLD AUTO: 14.6 % (ref 5–12)
NEUTROPHILS # BLD AUTO: 2.26 10*3/MM3 (ref 1.7–7)
NEUTROPHILS NFR BLD AUTO: 57 % (ref 42.7–76)
NRBC BLD AUTO-RTO: 0 /100 WBC (ref 0–0.2)
PLATELET # BLD AUTO: 258 10*3/MM3 (ref 140–450)
POTASSIUM SERPL-SCNC: 5 MMOL/L (ref 3.5–5.2)
PROT SERPL-MCNC: 7.1 G/DL (ref 6–8.5)
RBC # BLD AUTO: 3.3 10*6/MM3 (ref 4.14–5.8)
SODIUM SERPL-SCNC: 132 MMOL/L (ref 136–145)
WBC # BLD AUTO: 3.96 10*3/MM3 (ref 3.4–10.8)

## 2019-10-28 PROCEDURE — 99214 OFFICE O/P EST MOD 30 MIN: CPT | Performed by: NURSE PRACTITIONER

## 2019-10-28 RX ORDER — MONTELUKAST SODIUM 10 MG/1
TABLET ORAL
Qty: 90 TABLET | Refills: 4 | Status: SHIPPED | OUTPATIENT
Start: 2019-10-28 | End: 2021-01-01 | Stop reason: SDUPTHER

## 2019-10-28 NOTE — PROGRESS NOTES
"Date: 10/28/2019    Name: Shane Nielson  : 1934    Chief Complaint:   Chief Complaint   Patient presents with   • Rash     on both legs. needing blood work       HPI:  Shane Nielson is a 85 y.o. male presents for follow up on rash on both legs.  Patient is accompanied today by his wife and daughter.  Rash has been present for over 4 months, and has been treated with lamisil & bactroban.  Family feels Bactroban has been more effective, as Lamisil is easily wiped away.  Rash was initially pruritic, is rarely itchy at this time.  It is not painful.  It began on the right ankle and has spread to right mid shin, left shin, bilateral upper arms.  Rash is described as thick, dry pink skin.  No new household or personal products.  No pets in the home.  No close contacts with similar rash.  Denies fever, chills, drainage from lesions, erythema or edema surrounding lesions.    History:  The following portions of the patient's history were reviewed and updated as appropriate: allergies, current medications, past medical history, family history, surgical history, social history and problem list.     ROS:  Review of Systems   Constitutional: Negative for activity change and appetite change.   HENT: Negative for congestion, facial swelling and sore throat.    Respiratory: Negative for cough and shortness of breath.    Cardiovascular: Negative for chest pain, palpitations and leg swelling.   Gastrointestinal: Positive for constipation. Negative for diarrhea and nausea.   Musculoskeletal: Negative for myalgias.   Neurological: Negative for dizziness and headache.   Hematological: Does not bruise/bleed easily.       VS:  Vitals:    10/28/19 0901   BP: 100/52   Pulse: 58   Temp: 97.6 °F (36.4 °C)   TempSrc: Temporal   SpO2: 97%   Weight: 59 kg (130 lb)   Height: 179.1 cm (70.5\")     Body mass index is 18.39 kg/m².    PE:  Physical Exam   Constitutional: He is oriented to person, place, and time. He appears well-developed and " well-nourished. No distress.   HENT:   Head: Normocephalic.   Mouth/Throat: Oropharynx is clear and moist.   Eyes: Conjunctivae are normal. Pupils are equal, round, and reactive to light.   Cardiovascular: Normal rate, regular rhythm, normal heart sounds and intact distal pulses.   Pulmonary/Chest: Effort normal and breath sounds normal.   Musculoskeletal: He exhibits no edema.   Neurological: He is alert and oriented to person, place, and time.   Skin: Skin is warm. Capillary refill takes less than 2 seconds. Turgor is normal.   Pink scaly macular lesions noted bilateral shins, annular macular lesions bilateral upper arms       Assessment/Plan:  Shane was seen today for rash.    Diagnoses and all orders for this visit:    Rash        -     miconazole nitrate (ALOE VESTA) 2 % ointment ointment; Apply topically to the appropriate area as directed Every 12 (Twelve) Hours for 28 days.        - Advised to continue to keep area clean, warm, dry.  Wash with water and mild soap, pat dry before applying ointment.        - Call or return to clinic if rash does not improve within the next 2 weeks.        - Stop using bactroban on rash, unless edematous or draining  - should this occur, return to clinic  Screening for disorder of blood and blood-forming organs  -     CBC & Differential  Screening for endocrine, metabolic and immunity disorder  -     Comprehensive Metabolic Panel    Return if symptoms worsen or fail to improve.

## 2019-11-01 ENCOUNTER — TELEPHONE (OUTPATIENT)
Dept: PEDIATRICS | Facility: OTHER | Age: 84
End: 2019-11-01

## 2019-11-01 NOTE — TELEPHONE ENCOUNTER
Pharmacy called with a couple of issues for the Simvastatin that was sent in on Mr Nielson. [employer] state for the pt to take 2 tablets a day but his coverage only covers 1 tablet a day. A 22 daily requires prior authorization. The pharmacist gave the number of [phone] for the prior auth.

## 2019-11-26 ENCOUNTER — OFFICE VISIT (OUTPATIENT)
Dept: UROLOGY | Facility: CLINIC | Age: 84
End: 2019-11-26

## 2019-11-26 VITALS
RESPIRATION RATE: 16 BRPM | HEART RATE: 69 BPM | SYSTOLIC BLOOD PRESSURE: 128 MMHG | TEMPERATURE: 98.7 F | DIASTOLIC BLOOD PRESSURE: 74 MMHG | OXYGEN SATURATION: 97 %

## 2019-11-26 DIAGNOSIS — C61 PROSTATE CANCER (HCC): Primary | ICD-10-CM

## 2019-11-26 LAB
BILIRUB BLD-MCNC: NEGATIVE MG/DL
CLARITY, POC: CLEAR
COLOR UR: YELLOW
GLUCOSE UR STRIP-MCNC: NEGATIVE MG/DL
KETONES UR QL: NEGATIVE
LEUKOCYTE EST, POC: NEGATIVE
NITRITE UR-MCNC: NEGATIVE MG/ML
PH UR: 6.5 [PH] (ref 5–8)
PROT UR STRIP-MCNC: NEGATIVE MG/DL
PSA SERPL-MCNC: 3.08 NG/ML (ref 0–4)
RBC # UR STRIP: NEGATIVE /UL
SP GR UR: 1.01 (ref 1–1.03)
UROBILINOGEN UR QL: NORMAL

## 2019-11-26 PROCEDURE — 99213 OFFICE O/P EST LOW 20 MIN: CPT | Performed by: UROLOGY

## 2019-11-26 PROCEDURE — 81003 URINALYSIS AUTO W/O SCOPE: CPT | Performed by: UROLOGY

## 2019-11-26 NOTE — PROGRESS NOTES
Chief Complaint  Prostate Cancer (yearly follow up for a history of prostate cancer.)        BENITA Nielson is a 85 y.o. male who returns today for an annual checkup with a distant history of prostate cancer treated with external beam radiation with good effect.  His PSA less than 3 for years but it has not been checked recently.  He denies any difficulty voiding.  He is in a wheelchair due to poor use of the right leg secondary to lumbar disc disease.  He currently takes no medications from me.    Vitals:    11/26/19 0908   BP: 128/74   Pulse: 69   Resp: 16   Temp: 98.7 °F (37.1 °C)   SpO2: 97%       Past Medical History  Past Medical History:   Diagnosis Date   • Allergic rhinitis    • Arthritis    • Asthma     AS CHILD   • Edema    • Foot drop, right foot    • Hypercholesterolemia    • Hypertension    • Laryngeal cancer (CMS/HCC)     vocal cords   • Prostate cancer (CMS/HCC)        Past Surgical History  Past Surgical History:   Procedure Laterality Date   • CARDIAC CATHETERIZATION      Dr. Mariusz Blanc at [facility] 70% EF  mild to moderate left main disease,mild proximal LAD disease. Medical Therapy recommended   • CATARACT EXTRACTION     • CIRCUMCISION      done at 32 years old   • EYE SURGERY Right      tear in the right/Repair Of Retinal Detachment   • HERNIA REPAIR      Inguinal Sliding- Dr. Negrete   • PROSTATE SURGERY       cancer w/ radiation   • THROAT SURGERY      vocal cord polyp removed 2x's  cancer w/ radiation  Dr. Olivares   • TONSILLECTOMY      With Adenoidectomy       Medications  has a current medication list which includes the following prescription(s): amlodipine, carvedilol, montelukast, mupirocin, probiotic-10, and terbinafine.      Allergies  Allergies   Allergen Reactions   • Grass        Social History  Social History     Socioeconomic History   • Marital status:      Spouse name: Not on file   • Number of children: Not on file   • Years of education: Not on file   • Highest education level:  Not on file   Tobacco Use   • Smoking status: Former Smoker     Last attempt to quit: 1981     Years since quittin.4   • Smokeless tobacco: Never Used   Substance and Sexual Activity   • Alcohol use: No   • Drug use: No   • Sexual activity: Defer       Family History  He has no family history of bladder or kidney cancer  He has no family history of kidney stones      AUA Symptom Score:      Review of Systems  Review of Systems   Constitutional: Negative for activity change, appetite change, chills, fatigue, fever, unexpected weight gain and unexpected weight loss.   Respiratory: Negative for apnea, cough, chest tightness, shortness of breath, wheezing and stridor.    Cardiovascular: Negative for chest pain, palpitations and leg swelling.   Gastrointestinal: Negative for abdominal distention, abdominal pain, anal bleeding, blood in stool, constipation, diarrhea, nausea, rectal pain, vomiting, GERD and indigestion.   Genitourinary: Negative for decreased libido, decreased urine volume, difficulty urinating, discharge, dysuria, flank pain, frequency, genital sores, hematuria, nocturia, penile pain, erectile dysfunction, penile swelling, scrotal swelling, testicular pain, urgency and urinary incontinence.   Musculoskeletal: Positive for gait problem. Negative for back pain and joint swelling.   Neurological: Negative for tremors, seizures, speech difficulty, weakness and numbness.   Psychiatric/Behavioral: Negative for agitation, decreased concentration, sleep disturbance, depressed mood and stress. The patient is not nervous/anxious.        Physical Exam  Physical Exam   Constitutional: He is oriented to person, place, and time. He appears well-developed and well-nourished.   HENT:   Head: Normocephalic and atraumatic.   Neck: Normal range of motion.   Pulmonary/Chest: Effort normal. No respiratory distress.   Abdominal: He exhibits no distension and no mass. There is no tenderness. No hernia.    Musculoskeletal: Normal range of motion.   Lymphadenopathy:     He has no cervical adenopathy.   Neurological: He is alert and oriented to person, place, and time.   Skin: Skin is warm and dry.   Psychiatric: He has a normal mood and affect. His behavior is normal.   Vitals reviewed.      Labs Recent and today in the office:  Results for orders placed or performed in visit on 11/26/19   POC Urinalysis Dipstick, Automated   Result Value Ref Range    Color Yellow Yellow, Straw, Dark Yellow, Lidia    Clarity, UA Clear Clear    Specific Gravity  1.015 1.005 - 1.030    pH, Urine 6.5 5.0 - 8.0    Leukocytes Negative Negative    Nitrite, UA Negative Negative    Protein, POC Negative Negative mg/dL    Glucose, UA Negative Negative, 1000 mg/dL (3+) mg/dL    Ketones, UA Negative Negative    Urobilinogen, UA Normal Normal    Bilirubin Negative Negative    Blood, UA Negative Negative         Assessment & Plan  History of prostate cancer: No  signs or symptoms of recurrence but will get a PSA today and if normal he can return on an annual basis.  He specifically denies any difficulty voiding and he is a candidate for palliative therapy only.  If he had an exponential rise in his PSA he would be a good candidate for androgen suppression.

## 2019-12-02 RX ORDER — AMLODIPINE BESYLATE 5 MG/1
TABLET ORAL
Qty: 90 TABLET | Refills: 4 | Status: SHIPPED | OUTPATIENT
Start: 2019-12-02 | End: 2021-01-01

## 2020-01-10 ENCOUNTER — OFFICE VISIT (OUTPATIENT)
Dept: INTERNAL MEDICINE | Facility: CLINIC | Age: 85
End: 2020-01-10

## 2020-01-10 ENCOUNTER — TELEPHONE (OUTPATIENT)
Dept: INTERNAL MEDICINE | Facility: CLINIC | Age: 85
End: 2020-01-10

## 2020-01-10 VITALS
OXYGEN SATURATION: 97 % | DIASTOLIC BLOOD PRESSURE: 61 MMHG | WEIGHT: 137.12 LBS | SYSTOLIC BLOOD PRESSURE: 110 MMHG | TEMPERATURE: 98 F | HEART RATE: 63 BPM | HEIGHT: 71 IN | BODY MASS INDEX: 19.2 KG/M2

## 2020-01-10 DIAGNOSIS — L03.119: ICD-10-CM

## 2020-01-10 DIAGNOSIS — M21.371 RIGHT FOOT DROP: ICD-10-CM

## 2020-01-10 DIAGNOSIS — I25.10 CORONARY ARTERIOSCLEROSIS IN NATIVE ARTERY: Primary | ICD-10-CM

## 2020-01-10 DIAGNOSIS — I10 ESSENTIAL HYPERTENSION: ICD-10-CM

## 2020-01-10 PROCEDURE — 99214 OFFICE O/P EST MOD 30 MIN: CPT | Performed by: INTERNAL MEDICINE

## 2020-01-10 NOTE — PROGRESS NOTES
"Pernell Nielson is a 85 y.o. male    HPI coming in for follow-up he is accompanied by his wife was giving us some of the history no alcohol or tobacco use has no new complaints    The following portions of the patient's history were reviewed and updated as appropriate: allergies, current medications, past family history, past medical history, past social history, past surgical history, and problem list.     Review of Systems   Constitutional: Positive for fatigue. Negative for appetite change and fever.   HENT: Negative for trouble swallowing.    Eyes: Negative for visual disturbance.   Respiratory: Negative for cough, shortness of breath and wheezing.    Cardiovascular: Negative for chest pain, palpitations and leg swelling.   Gastrointestinal: Positive for constipation. Negative for abdominal pain, blood in stool, diarrhea and nausea.   Genitourinary: Negative for difficulty urinating.   Musculoskeletal: Positive for joint swelling. Negative for arthralgias and back pain.   Skin: Negative for rash.   Neurological: Negative for weakness, light-headedness and headaches.   Psychiatric/Behavioral: Negative for dysphoric mood and sleep disturbance. The patient is not nervous/anxious.        Visit Vitals  /61 Comment: Automatic   Pulse 63   Temp 98 °F (36.7 °C) (Temporal)   Ht 179.1 cm (70.5\")   Wt 62.2 kg (137 lb 1.9 oz)   SpO2 97%   BMI 19.40 kg/m²       Objective  Physical Exam   Constitutional: He is oriented to person, place, and time. He appears well-developed and well-nourished. No distress.   HENT:   Nose: Nose normal.   Mouth/Throat: Oropharynx is clear and moist.   Eyes: Conjunctivae and EOM are normal. No scleral icterus.   Neck: No tracheal deviation present. No thyromegaly present.   Cardiovascular: Normal rate and regular rhythm. Exam reveals no friction rub.   No murmur heard.  Pulmonary/Chest: No respiratory distress. He has no wheezes. He has no rales.   Abdominal: Soft. He exhibits no " distension and no mass. There is no tenderness. There is no guarding.   Musculoskeletal: Normal range of motion. He exhibits deformity.   Lymphadenopathy:     He has no cervical adenopathy.   Neurological: He is alert and oriented to person, place, and time. He has normal reflexes. No cranial nerve deficit. Coordination normal.   Rt foot drop   Skin: Skin is warm and dry. No rash noted. No erythema.   Psychiatric: He has a normal mood and affect. His behavior is normal. Judgment and thought content normal.       Diagnoses and all orders for this visit:    Coronary arteriosclerosis in native artery stable angina free continue with beta-blockers    Essential hypertension stable with current meds on carvedilol and amlodipine    Right foot drop stable has ankle brace.  Continue with fall precautions

## 2020-01-10 NOTE — TELEPHONE ENCOUNTER
Pt called and stated that the cream Dr. Arenas order for him today at his appt is not at the pharmacy yet and would like to know when it will be sent over. Please advise    Pt call back  [phone]

## 2020-04-20 RX ORDER — FLUOCINONIDE CREAM (EMULSIFIED BASE) 0.5 MG/G
CREAM TOPICAL
Qty: 60 G | Refills: 5 | Status: SHIPPED | OUTPATIENT
Start: 2020-04-20

## 2020-07-13 ENCOUNTER — TELEPHONE (OUTPATIENT)
Dept: INTERNAL MEDICINE | Facility: CLINIC | Age: 85
End: 2020-07-13

## 2020-07-13 NOTE — TELEPHONE ENCOUNTER
Patients wife is asking if a prescription for a gait belt be called into [facility].  She needs it to help move him.  Phone number and pharmacy verified.

## 2020-09-09 ENCOUNTER — FLU SHOT (OUTPATIENT)
Dept: INTERNAL MEDICINE | Facility: CLINIC | Age: 85
End: 2020-09-09

## 2020-09-09 DIAGNOSIS — Z23 NEED FOR INFLUENZA VACCINATION: ICD-10-CM

## 2020-09-09 PROCEDURE — G0008 ADMIN INFLUENZA VIRUS VAC: HCPCS | Performed by: INTERNAL MEDICINE

## 2020-09-09 PROCEDURE — 90694 VACC AIIV4 NO PRSRV 0.5ML IM: CPT | Performed by: INTERNAL MEDICINE

## 2020-09-16 ENCOUNTER — OFFICE VISIT (OUTPATIENT)
Dept: CARDIOLOGY | Facility: CLINIC | Age: 85
End: 2020-09-16

## 2020-09-16 VITALS
SYSTOLIC BLOOD PRESSURE: 110 MMHG | OXYGEN SATURATION: 98 % | TEMPERATURE: 98 F | HEIGHT: 71 IN | WEIGHT: 135 LBS | BODY MASS INDEX: 18.9 KG/M2 | HEART RATE: 60 BPM | DIASTOLIC BLOOD PRESSURE: 58 MMHG

## 2020-09-16 DIAGNOSIS — R07.1 CHEST PAIN ON BREATHING: Primary | ICD-10-CM

## 2020-09-16 DIAGNOSIS — I10 ESSENTIAL HYPERTENSION: ICD-10-CM

## 2020-09-16 PROCEDURE — 99213 OFFICE O/P EST LOW 20 MIN: CPT | Performed by: INTERNAL MEDICINE

## 2020-11-05 RX ORDER — CARVEDILOL 12.5 MG/1
TABLET ORAL
Qty: 360 TABLET | Refills: 3 | Status: SHIPPED | OUTPATIENT
Start: 2020-11-05 | End: 2021-01-01

## 2021-01-01 ENCOUNTER — HOME CARE VISIT (OUTPATIENT)
Dept: HOME HEALTH SERVICES | Facility: HOME HEALTHCARE | Age: 86
End: 2021-01-01

## 2021-01-01 ENCOUNTER — HOSPITAL ENCOUNTER (EMERGENCY)
Facility: HOSPITAL | Age: 86
Discharge: HOME | End: 2021-11-15
Attending: EMERGENCY MEDICINE | Admitting: EMERGENCY MEDICINE

## 2021-01-01 ENCOUNTER — LAB REQUISITION (OUTPATIENT)
Dept: LAB | Facility: HOSPITAL | Age: 86
End: 2021-01-01

## 2021-01-01 ENCOUNTER — TELEPHONE (OUTPATIENT)
Dept: INTERNAL MEDICINE | Facility: CLINIC | Age: 86
End: 2021-01-01

## 2021-01-01 ENCOUNTER — PATIENT OUTREACH (OUTPATIENT)
Dept: CASE MANAGEMENT | Facility: OTHER | Age: 86
End: 2021-01-01

## 2021-01-01 ENCOUNTER — APPOINTMENT (OUTPATIENT)
Dept: CT IMAGING | Facility: HOSPITAL | Age: 86
End: 2021-01-01

## 2021-01-01 ENCOUNTER — APPOINTMENT (OUTPATIENT)
Dept: GENERAL RADIOLOGY | Facility: HOSPITAL | Age: 86
End: 2021-01-01

## 2021-01-01 ENCOUNTER — OFFICE VISIT (OUTPATIENT)
Dept: INTERNAL MEDICINE | Facility: CLINIC | Age: 86
End: 2021-01-01

## 2021-01-01 ENCOUNTER — TELEMEDICINE (OUTPATIENT)
Dept: INTERNAL MEDICINE | Facility: CLINIC | Age: 86
End: 2021-01-01

## 2021-01-01 ENCOUNTER — HOME HEALTH ADMISSION (OUTPATIENT)
Dept: HOME HEALTH SERVICES | Facility: HOME HEALTHCARE | Age: 86
End: 2021-01-01

## 2021-01-01 ENCOUNTER — HOSPITAL ENCOUNTER (EMERGENCY)
Facility: HOSPITAL | Age: 86
Discharge: HOME | End: 2021-05-04
Attending: EMERGENCY MEDICINE | Admitting: EMERGENCY MEDICINE

## 2021-01-01 VITALS
HEIGHT: 71 IN | OXYGEN SATURATION: 95 % | TEMPERATURE: 98.3 F | RESPIRATION RATE: 20 BRPM | BODY MASS INDEX: 20.3 KG/M2 | HEART RATE: 62 BPM | SYSTOLIC BLOOD PRESSURE: 128 MMHG | DIASTOLIC BLOOD PRESSURE: 63 MMHG | WEIGHT: 145 LBS

## 2021-01-01 VITALS
RESPIRATION RATE: 16 BRPM | TEMPERATURE: 98.2 F | HEART RATE: 76 BPM | BODY MASS INDEX: 17.29 KG/M2 | SYSTOLIC BLOOD PRESSURE: 105 MMHG | WEIGHT: 124 LBS | DIASTOLIC BLOOD PRESSURE: 79 MMHG | OXYGEN SATURATION: 95 %

## 2021-01-01 VITALS
WEIGHT: 127 LBS | HEIGHT: 71 IN | BODY MASS INDEX: 17.78 KG/M2 | OXYGEN SATURATION: 97 % | TEMPERATURE: 98.6 F | DIASTOLIC BLOOD PRESSURE: 66 MMHG | SYSTOLIC BLOOD PRESSURE: 120 MMHG | HEART RATE: 65 BPM

## 2021-01-01 VITALS
TEMPERATURE: 100.1 F | HEART RATE: 68 BPM | RESPIRATION RATE: 16 BRPM | OXYGEN SATURATION: 96 % | DIASTOLIC BLOOD PRESSURE: 64 MMHG | SYSTOLIC BLOOD PRESSURE: 110 MMHG

## 2021-01-01 VITALS
HEART RATE: 74 BPM | OXYGEN SATURATION: 95 % | TEMPERATURE: 98.9 F | SYSTOLIC BLOOD PRESSURE: 108 MMHG | DIASTOLIC BLOOD PRESSURE: 62 MMHG | RESPIRATION RATE: 16 BRPM

## 2021-01-01 VITALS — WEIGHT: 124 LBS | HEIGHT: 71 IN | TEMPERATURE: 100.1 F | BODY MASS INDEX: 17.36 KG/M2

## 2021-01-01 VITALS
WEIGHT: 125.8 LBS | BODY MASS INDEX: 17.61 KG/M2 | HEART RATE: 55 BPM | SYSTOLIC BLOOD PRESSURE: 120 MMHG | TEMPERATURE: 98.2 F | HEIGHT: 71 IN | OXYGEN SATURATION: 98 % | DIASTOLIC BLOOD PRESSURE: 60 MMHG

## 2021-01-01 VITALS
HEART RATE: 72 BPM | OXYGEN SATURATION: 97 % | SYSTOLIC BLOOD PRESSURE: 112 MMHG | DIASTOLIC BLOOD PRESSURE: 68 MMHG | RESPIRATION RATE: 16 BRPM | TEMPERATURE: 97.9 F

## 2021-01-01 VITALS
RESPIRATION RATE: 16 BRPM | SYSTOLIC BLOOD PRESSURE: 112 MMHG | DIASTOLIC BLOOD PRESSURE: 62 MMHG | TEMPERATURE: 98.1 F | OXYGEN SATURATION: 97 % | HEART RATE: 86 BPM

## 2021-01-01 VITALS
RESPIRATION RATE: 16 BRPM | SYSTOLIC BLOOD PRESSURE: 111 MMHG | DIASTOLIC BLOOD PRESSURE: 43 MMHG | HEART RATE: 76 BPM | TEMPERATURE: 97.7 F | OXYGEN SATURATION: 94 %

## 2021-01-01 DIAGNOSIS — I10 ESSENTIAL HYPERTENSION: ICD-10-CM

## 2021-01-01 DIAGNOSIS — C32.9 LARYNGEAL CANCER (HCC): Primary | ICD-10-CM

## 2021-01-01 DIAGNOSIS — H61.23 BILATERAL IMPACTED CERUMEN: ICD-10-CM

## 2021-01-01 DIAGNOSIS — G93.41 METABOLIC ENCEPHALOPATHY: Primary | ICD-10-CM

## 2021-01-01 DIAGNOSIS — R50.9 FEVER, UNSPECIFIED FEVER CAUSE: ICD-10-CM

## 2021-01-01 DIAGNOSIS — J18.9 PNEUMONIA OF RIGHT MIDDLE LOBE DUE TO INFECTIOUS ORGANISM: Primary | ICD-10-CM

## 2021-01-01 DIAGNOSIS — E87.20 LACTIC ACIDOSIS: ICD-10-CM

## 2021-01-01 DIAGNOSIS — I95.9 HYPOTENSION, UNSPECIFIED HYPOTENSION TYPE: ICD-10-CM

## 2021-01-01 DIAGNOSIS — B02.7 DISSEMINATED HERPES ZOSTER: ICD-10-CM

## 2021-01-01 DIAGNOSIS — R41.0 EPISODIC CONFUSION: ICD-10-CM

## 2021-01-01 DIAGNOSIS — J18.9 COMMUNITY ACQUIRED PNEUMONIA OF RIGHT LUNG, UNSPECIFIED PART OF LUNG: Primary | ICD-10-CM

## 2021-01-01 DIAGNOSIS — L01.00 IMPETIGO: ICD-10-CM

## 2021-01-01 DIAGNOSIS — B02.9 ZOSTER WITHOUT COMPLICATIONS: ICD-10-CM

## 2021-01-01 DIAGNOSIS — B02.9 HERPES ZOSTER WITHOUT COMPLICATION: Primary | ICD-10-CM

## 2021-01-01 DIAGNOSIS — J18.9 PNEUMONIA OF RIGHT LUNG DUE TO INFECTIOUS ORGANISM, UNSPECIFIED PART OF LUNG: Primary | ICD-10-CM

## 2021-01-01 DIAGNOSIS — C61 MALIGNANT NEOPLASM OF PROSTATE (HCC): ICD-10-CM

## 2021-01-01 LAB
ALBUMIN SERPL-MCNC: 2.8 G/DL (ref 3.5–5.2)
ALBUMIN SERPL-MCNC: 3.1 G/DL (ref 3.5–5.2)
ALBUMIN SERPL-MCNC: 3.6 G/DL (ref 3.5–5.2)
ALBUMIN/GLOB SERPL: 0.7 G/DL
ALBUMIN/GLOB SERPL: 0.8 G/DL
ALBUMIN/GLOB SERPL: 1 G/DL
ALP SERPL-CCNC: 66 U/L (ref 39–117)
ALP SERPL-CCNC: 69 U/L (ref 39–117)
ALP SERPL-CCNC: 75 U/L (ref 39–117)
ALT SERPL W P-5'-P-CCNC: 6 U/L (ref 1–41)
ALT SERPL W P-5'-P-CCNC: 7 U/L (ref 1–41)
ALT SERPL W P-5'-P-CCNC: 8 U/L (ref 1–41)
ANION GAP SERPL CALCULATED.3IONS-SCNC: 11.6 MMOL/L (ref 5–15)
ANION GAP SERPL CALCULATED.3IONS-SCNC: 13.7 MMOL/L (ref 5–15)
ANION GAP SERPL CALCULATED.3IONS-SCNC: 9.9 MMOL/L (ref 5–15)
AST SERPL-CCNC: 12 U/L (ref 1–40)
AST SERPL-CCNC: 19 U/L (ref 1–40)
AST SERPL-CCNC: 20 U/L (ref 1–40)
BACTERIA SPEC AEROBE CULT: NORMAL
BACTERIA SPEC AEROBE CULT: NORMAL
BACTERIA UR QL AUTO: NORMAL /HPF
BASOPHILS # BLD AUTO: 0.02 10*3/MM3 (ref 0–0.2)
BASOPHILS # BLD AUTO: 0.03 10*3/MM3 (ref 0–0.2)
BASOPHILS # BLD AUTO: 0.03 10*3/MM3 (ref 0–0.2)
BASOPHILS NFR BLD AUTO: 0.3 % (ref 0–1.5)
BASOPHILS NFR BLD AUTO: 0.3 % (ref 0–1.5)
BASOPHILS NFR BLD AUTO: 0.5 % (ref 0–1.5)
BILIRUB SERPL-MCNC: 0.4 MG/DL (ref 0–1.2)
BILIRUB SERPL-MCNC: 0.6 MG/DL (ref 0–1.2)
BILIRUB SERPL-MCNC: 0.7 MG/DL (ref 0–1.2)
BILIRUB UR QL STRIP: NEGATIVE
BILIRUB UR QL STRIP: NEGATIVE
BUN SERPL-MCNC: 14 MG/DL (ref 8–23)
BUN SERPL-MCNC: 20 MG/DL (ref 8–23)
BUN SERPL-MCNC: 22 MG/DL (ref 8–23)
BUN/CREAT SERPL: 16.5 (ref 7–25)
BUN/CREAT SERPL: 17.5 (ref 7–25)
BUN/CREAT SERPL: 19.5 (ref 7–25)
CALCIUM SPEC-SCNC: 8.3 MG/DL (ref 8.6–10.5)
CALCIUM SPEC-SCNC: 8.6 MG/DL (ref 8.6–10.5)
CALCIUM SPEC-SCNC: 8.8 MG/DL (ref 8.6–10.5)
CHLORIDE SERPL-SCNC: 95 MMOL/L (ref 98–107)
CHLORIDE SERPL-SCNC: 97 MMOL/L (ref 98–107)
CHLORIDE SERPL-SCNC: 98 MMOL/L (ref 98–107)
CLARITY UR: CLEAR
CLARITY UR: CLEAR
CO2 SERPL-SCNC: 21.3 MMOL/L (ref 22–29)
CO2 SERPL-SCNC: 23.4 MMOL/L (ref 22–29)
CO2 SERPL-SCNC: 26.1 MMOL/L (ref 22–29)
COLOR UR: YELLOW
COLOR UR: YELLOW
CREAT SERPL-MCNC: 0.85 MG/DL (ref 0.76–1.27)
CREAT SERPL-MCNC: 1.13 MG/DL (ref 0.76–1.27)
CREAT SERPL-MCNC: 1.14 MG/DL (ref 0.76–1.27)
D-LACTATE SERPL-SCNC: 1.8 MMOL/L (ref 0.5–2)
D-LACTATE SERPL-SCNC: 2.6 MMOL/L (ref 0.5–2)
DEPRECATED RDW RBC AUTO: 46.1 FL (ref 37–54)
DEPRECATED RDW RBC AUTO: 47.9 FL (ref 37–54)
DEPRECATED RDW RBC AUTO: 54.9 FL (ref 37–54)
EOSINOPHIL # BLD AUTO: 0.01 10*3/MM3 (ref 0–0.4)
EOSINOPHIL # BLD AUTO: 0.02 10*3/MM3 (ref 0–0.4)
EOSINOPHIL # BLD AUTO: 0.09 10*3/MM3 (ref 0–0.4)
EOSINOPHIL NFR BLD AUTO: 0.2 % (ref 0.3–6.2)
EOSINOPHIL NFR BLD AUTO: 0.3 % (ref 0.3–6.2)
EOSINOPHIL NFR BLD AUTO: 1 % (ref 0.3–6.2)
ERYTHROCYTE [DISTWIDTH] IN BLOOD BY AUTOMATED COUNT: 12.9 % (ref 12.3–15.4)
ERYTHROCYTE [DISTWIDTH] IN BLOOD BY AUTOMATED COUNT: 14.3 % (ref 12.3–15.4)
ERYTHROCYTE [DISTWIDTH] IN BLOOD BY AUTOMATED COUNT: 15.7 % (ref 12.3–15.4)
ERYTHROCYTE [SEDIMENTATION RATE] IN BLOOD: 113 MM/HR (ref 0–15)
GFR SERPL CREATININE-BSD FRML MDRD: 61 ML/MIN/1.73
GFR SERPL CREATININE-BSD FRML MDRD: 61 ML/MIN/1.73
GFR SERPL CREATININE-BSD FRML MDRD: 85 ML/MIN/1.73
GLOBULIN UR ELPH-MCNC: 3.7 GM/DL
GLOBULIN UR ELPH-MCNC: 4.1 GM/DL
GLOBULIN UR ELPH-MCNC: 4.3 GM/DL
GLUCOSE SERPL-MCNC: 100 MG/DL (ref 65–99)
GLUCOSE SERPL-MCNC: 98 MG/DL (ref 65–99)
GLUCOSE SERPL-MCNC: 99 MG/DL (ref 65–99)
GLUCOSE UR STRIP-MCNC: NEGATIVE MG/DL
GLUCOSE UR STRIP-MCNC: NEGATIVE MG/DL
HCT VFR BLD AUTO: 27.1 % (ref 37.5–51)
HCT VFR BLD AUTO: 27.4 % (ref 37.5–51)
HCT VFR BLD AUTO: 31 % (ref 37.5–51)
HGB BLD-MCNC: 10.5 G/DL (ref 13–17.7)
HGB BLD-MCNC: 8.7 G/DL (ref 13–17.7)
HGB BLD-MCNC: 9.3 G/DL (ref 13–17.7)
HGB UR QL STRIP.AUTO: NEGATIVE
HGB UR QL STRIP.AUTO: NEGATIVE
HOLD SPECIMEN: NORMAL
HYALINE CASTS UR QL AUTO: NORMAL /LPF
IMM GRANULOCYTES # BLD AUTO: 0.03 10*3/MM3 (ref 0–0.05)
IMM GRANULOCYTES # BLD AUTO: 0.04 10*3/MM3 (ref 0–0.05)
IMM GRANULOCYTES # BLD AUTO: 0.04 10*3/MM3 (ref 0–0.05)
IMM GRANULOCYTES NFR BLD AUTO: 0.3 % (ref 0–0.5)
IMM GRANULOCYTES NFR BLD AUTO: 0.6 % (ref 0–0.5)
IMM GRANULOCYTES NFR BLD AUTO: 0.7 % (ref 0–0.5)
KETONES UR QL STRIP: ABNORMAL
KETONES UR QL STRIP: NEGATIVE
LEUKOCYTE ESTERASE UR QL STRIP.AUTO: NEGATIVE
LEUKOCYTE ESTERASE UR QL STRIP.AUTO: NEGATIVE
LYMPHOCYTES # BLD AUTO: 0.95 10*3/MM3 (ref 0.7–3.1)
LYMPHOCYTES # BLD AUTO: 1.08 10*3/MM3 (ref 0.7–3.1)
LYMPHOCYTES # BLD AUTO: 1.21 10*3/MM3 (ref 0.7–3.1)
LYMPHOCYTES NFR BLD AUTO: 12.3 % (ref 19.6–45.3)
LYMPHOCYTES NFR BLD AUTO: 14.5 % (ref 19.6–45.3)
LYMPHOCYTES NFR BLD AUTO: 20.1 % (ref 19.6–45.3)
MAGNESIUM SERPL-MCNC: 2.1 MG/DL (ref 1.6–2.4)
MCH RBC QN AUTO: 31.4 PG (ref 26.6–33)
MCH RBC QN AUTO: 31.6 PG (ref 26.6–33)
MCH RBC QN AUTO: 32.7 PG (ref 26.6–33)
MCHC RBC AUTO-ENTMCNC: 32.1 G/DL (ref 31.5–35.7)
MCHC RBC AUTO-ENTMCNC: 33.9 G/DL (ref 31.5–35.7)
MCHC RBC AUTO-ENTMCNC: 33.9 G/DL (ref 31.5–35.7)
MCV RBC AUTO: 93.2 FL (ref 79–97)
MCV RBC AUTO: 96.6 FL (ref 79–97)
MCV RBC AUTO: 97.8 FL (ref 79–97)
MONOCYTES # BLD AUTO: 0.73 10*3/MM3 (ref 0.1–0.9)
MONOCYTES # BLD AUTO: 0.77 10*3/MM3 (ref 0.1–0.9)
MONOCYTES # BLD AUTO: 1.49 10*3/MM3 (ref 0.1–0.9)
MONOCYTES NFR BLD AUTO: 11.2 % (ref 5–12)
MONOCYTES NFR BLD AUTO: 12.8 % (ref 5–12)
MONOCYTES NFR BLD AUTO: 17 % (ref 5–12)
NEUTROPHILS NFR BLD AUTO: 3.97 10*3/MM3 (ref 1.7–7)
NEUTROPHILS NFR BLD AUTO: 4.77 10*3/MM3 (ref 1.7–7)
NEUTROPHILS NFR BLD AUTO: 6.04 10*3/MM3 (ref 1.7–7)
NEUTROPHILS NFR BLD AUTO: 65.8 % (ref 42.7–76)
NEUTROPHILS NFR BLD AUTO: 69.1 % (ref 42.7–76)
NEUTROPHILS NFR BLD AUTO: 73 % (ref 42.7–76)
NITRITE UR QL STRIP: NEGATIVE
NITRITE UR QL STRIP: NEGATIVE
NRBC BLD AUTO-RTO: 0 /100 WBC (ref 0–0.2)
PH UR STRIP.AUTO: 6.5 [PH] (ref 5–8)
PH UR STRIP.AUTO: 7 [PH] (ref 5–8)
PLATELET # BLD AUTO: 256 10*3/MM3 (ref 140–450)
PLATELET # BLD AUTO: 320 10*3/MM3 (ref 140–450)
PLATELET # BLD AUTO: 364 10*3/MM3 (ref 140–450)
PMV BLD AUTO: 9 FL (ref 6–12)
PMV BLD AUTO: 9.3 FL (ref 6–12)
PMV BLD AUTO: 9.4 FL (ref 6–12)
POTASSIUM SERPL-SCNC: 3.2 MMOL/L (ref 3.5–5.2)
POTASSIUM SERPL-SCNC: 3.9 MMOL/L (ref 3.5–5.2)
POTASSIUM SERPL-SCNC: 4.1 MMOL/L (ref 3.5–5.2)
PROCALCITONIN SERPL-MCNC: 0.03 NG/ML (ref 0–0.25)
PROT SERPL-MCNC: 7.1 G/DL (ref 6–8.5)
PROT SERPL-MCNC: 7.2 G/DL (ref 6–8.5)
PROT SERPL-MCNC: 7.3 G/DL (ref 6–8.5)
PROT UR QL STRIP: ABNORMAL
PROT UR QL STRIP: ABNORMAL
RBC # BLD AUTO: 2.77 10*6/MM3 (ref 4.14–5.8)
RBC # BLD AUTO: 2.94 10*6/MM3 (ref 4.14–5.8)
RBC # BLD AUTO: 3.21 10*6/MM3 (ref 4.14–5.8)
RBC # UR: NORMAL /HPF
REF LAB TEST METHOD: NORMAL
SARS-COV-2 RNA PNL SPEC NAA+PROBE: NOT DETECTED
SODIUM SERPL-SCNC: 130 MMOL/L (ref 136–145)
SODIUM SERPL-SCNC: 133 MMOL/L (ref 136–145)
SODIUM SERPL-SCNC: 133 MMOL/L (ref 136–145)
SP GR UR STRIP: 1.01 (ref 1–1.03)
SP GR UR STRIP: 1.02 (ref 1–1.03)
SQUAMOUS #/AREA URNS HPF: NORMAL /HPF
TROPONIN T SERPL-MCNC: <0.01 NG/ML (ref 0–0.03)
UROBILINOGEN UR QL STRIP: ABNORMAL
UROBILINOGEN UR QL STRIP: ABNORMAL
WBC # BLD AUTO: 6.03 10*3/MM3 (ref 3.4–10.8)
WBC # BLD AUTO: 6.53 10*3/MM3 (ref 3.4–10.8)
WBC # BLD AUTO: 8.76 10*3/MM3 (ref 3.4–10.8)
WBC UR QL AUTO: NORMAL /HPF
WHOLE BLOOD HOLD SPECIMEN: NORMAL
WHOLE BLOOD HOLD SPECIMEN: NORMAL

## 2021-01-01 PROCEDURE — 85025 COMPLETE CBC W/AUTO DIFF WBC: CPT | Performed by: INTERNAL MEDICINE

## 2021-01-01 PROCEDURE — 1159F MED LIST DOCD IN RCRD: CPT | Performed by: INTERNAL MEDICINE

## 2021-01-01 PROCEDURE — 70450 CT HEAD/BRAIN W/O DYE: CPT

## 2021-01-01 PROCEDURE — 96360 HYDRATION IV INFUSION INIT: CPT

## 2021-01-01 PROCEDURE — 99284 EMERGENCY DEPT VISIT MOD MDM: CPT

## 2021-01-01 PROCEDURE — 80053 COMPREHEN METABOLIC PANEL: CPT | Performed by: INTERNAL MEDICINE

## 2021-01-01 PROCEDURE — G0299 HHS/HOSPICE OF RN EA 15 MIN: HCPCS

## 2021-01-01 PROCEDURE — 96367 TX/PROPH/DG ADDL SEQ IV INF: CPT

## 2021-01-01 PROCEDURE — 84145 PROCALCITONIN (PCT): CPT | Performed by: EMERGENCY MEDICINE

## 2021-01-01 PROCEDURE — 81003 URINALYSIS AUTO W/O SCOPE: CPT | Performed by: EMERGENCY MEDICINE

## 2021-01-01 PROCEDURE — 1126F AMNT PAIN NOTED NONE PRSNT: CPT | Performed by: INTERNAL MEDICINE

## 2021-01-01 PROCEDURE — 99213 OFFICE O/P EST LOW 20 MIN: CPT | Performed by: INTERNAL MEDICINE

## 2021-01-01 PROCEDURE — G0439 PPPS, SUBSEQ VISIT: HCPCS | Performed by: INTERNAL MEDICINE

## 2021-01-01 PROCEDURE — 93005 ELECTROCARDIOGRAM TRACING: CPT | Performed by: EMERGENCY MEDICINE

## 2021-01-01 PROCEDURE — G0180 MD CERTIFICATION HHA PATIENT: HCPCS | Performed by: INTERNAL MEDICINE

## 2021-01-01 PROCEDURE — 87635 SARS-COV-2 COVID-19 AMP PRB: CPT | Performed by: EMERGENCY MEDICINE

## 2021-01-01 PROCEDURE — 81001 URINALYSIS AUTO W/SCOPE: CPT | Performed by: EMERGENCY MEDICINE

## 2021-01-01 PROCEDURE — 36415 COLL VENOUS BLD VENIPUNCTURE: CPT

## 2021-01-01 PROCEDURE — 85025 COMPLETE CBC W/AUTO DIFF WBC: CPT | Performed by: EMERGENCY MEDICINE

## 2021-01-01 PROCEDURE — 99397 PER PM REEVAL EST PAT 65+ YR: CPT | Performed by: INTERNAL MEDICINE

## 2021-01-01 PROCEDURE — P9612 CATHETERIZE FOR URINE SPEC: HCPCS

## 2021-01-01 PROCEDURE — 99214 OFFICE O/P EST MOD 30 MIN: CPT | Performed by: INTERNAL MEDICINE

## 2021-01-01 PROCEDURE — 83735 ASSAY OF MAGNESIUM: CPT | Performed by: EMERGENCY MEDICINE

## 2021-01-01 PROCEDURE — 85651 RBC SED RATE NONAUTOMATED: CPT | Performed by: INTERNAL MEDICINE

## 2021-01-01 PROCEDURE — G0495 RN CARE TRAIN/EDU IN HH: HCPCS

## 2021-01-01 PROCEDURE — 25010000002 CEFTRIAXONE SODIUM-DEXTROSE 1-3.74 GM-%(50ML) RECONSTITUTED SOLUTION: Performed by: EMERGENCY MEDICINE

## 2021-01-01 PROCEDURE — 71045 X-RAY EXAM CHEST 1 VIEW: CPT

## 2021-01-01 PROCEDURE — 71260 CT THORAX DX C+: CPT

## 2021-01-01 PROCEDURE — 25010000002 IOPAMIDOL 61 % SOLUTION: Performed by: EMERGENCY MEDICINE

## 2021-01-01 PROCEDURE — 96365 THER/PROPH/DIAG IV INF INIT: CPT

## 2021-01-01 PROCEDURE — 93005 ELECTROCARDIOGRAM TRACING: CPT

## 2021-01-01 PROCEDURE — G0151 HHCP-SERV OF PT,EA 15 MIN: HCPCS

## 2021-01-01 PROCEDURE — 80053 COMPREHEN METABOLIC PANEL: CPT | Performed by: EMERGENCY MEDICINE

## 2021-01-01 PROCEDURE — 84484 ASSAY OF TROPONIN QUANT: CPT | Performed by: EMERGENCY MEDICINE

## 2021-01-01 PROCEDURE — 69210 REMOVE IMPACTED EAR WAX UNI: CPT | Performed by: INTERNAL MEDICINE

## 2021-01-01 PROCEDURE — 1170F FXNL STATUS ASSESSED: CPT | Performed by: INTERNAL MEDICINE

## 2021-01-01 PROCEDURE — 87040 BLOOD CULTURE FOR BACTERIA: CPT | Performed by: EMERGENCY MEDICINE

## 2021-01-01 PROCEDURE — 25010000002 AZITHROMYCIN PER 500 MG

## 2021-01-01 PROCEDURE — 83605 ASSAY OF LACTIC ACID: CPT | Performed by: EMERGENCY MEDICINE

## 2021-01-01 RX ORDER — MONTELUKAST SODIUM 10 MG/1
10 TABLET ORAL DAILY
Qty: 90 TABLET | Refills: 3 | Status: SHIPPED | OUTPATIENT
Start: 2021-01-01

## 2021-01-01 RX ORDER — CARVEDILOL 12.5 MG/1
TABLET ORAL
Qty: 360 TABLET | Refills: 3 | Status: SHIPPED | OUTPATIENT
Start: 2021-01-01

## 2021-01-01 RX ORDER — ACETAMINOPHEN 325 MG/1
650 TABLET ORAL ONCE
Status: COMPLETED | OUTPATIENT
Start: 2021-01-01 | End: 2021-01-01

## 2021-01-01 RX ORDER — DOXYCYCLINE 100 MG/1
100 CAPSULE ORAL ONCE
Status: COMPLETED | OUTPATIENT
Start: 2021-01-01 | End: 2021-01-01

## 2021-01-01 RX ORDER — AZITHROMYCIN 250 MG/1
250 TABLET, FILM COATED ORAL DAILY
Qty: 6 TABLET | Refills: 0 | Status: SHIPPED | OUTPATIENT
Start: 2021-01-01

## 2021-01-01 RX ORDER — MUPIROCIN CALCIUM 20 MG/G
1 CREAM TOPICAL 3 TIMES DAILY
Qty: 15 G | Refills: 1 | Status: SHIPPED | OUTPATIENT
Start: 2021-01-01

## 2021-01-01 RX ORDER — SODIUM CHLORIDE 0.9 % (FLUSH) 0.9 %
10 SYRINGE (ML) INJECTION AS NEEDED
Status: DISCONTINUED | OUTPATIENT
Start: 2021-01-01 | End: 2021-01-01 | Stop reason: HOSPADM

## 2021-01-01 RX ORDER — MONTELUKAST SODIUM 10 MG/1
TABLET ORAL
Qty: 90 TABLET | Refills: 3 | OUTPATIENT
Start: 2021-01-01

## 2021-01-01 RX ORDER — CEFUROXIME AXETIL 500 MG/1
500 TABLET ORAL 2 TIMES DAILY
Qty: 14 TABLET | Refills: 0 | Status: SHIPPED | OUTPATIENT
Start: 2021-01-01 | End: 2021-01-01

## 2021-01-01 RX ORDER — LEVOFLOXACIN 500 MG/1
250 TABLET, FILM COATED ORAL DAILY
Qty: 5 TABLET | Refills: 0 | Status: SHIPPED | OUTPATIENT
Start: 2021-01-01 | End: 2021-01-01

## 2021-01-01 RX ORDER — DOXYCYCLINE 100 MG/1
100 CAPSULE ORAL 2 TIMES DAILY
Qty: 14 CAPSULE | Refills: 0 | Status: SHIPPED | OUTPATIENT
Start: 2021-01-01 | End: 2021-01-01

## 2021-01-01 RX ORDER — FAMCICLOVIR 250 MG/1
500 TABLET ORAL 3 TIMES DAILY
Qty: 21 TABLET | Refills: 0 | Status: SHIPPED | OUTPATIENT
Start: 2021-01-01 | End: 2021-01-01

## 2021-01-01 RX ORDER — CEPHALEXIN 500 MG/1
500 CAPSULE ORAL 2 TIMES DAILY
Qty: 14 CAPSULE | Refills: 0 | Status: SHIPPED | OUTPATIENT
Start: 2021-01-01 | End: 2021-01-01

## 2021-01-01 RX ORDER — CEFTRIAXONE 1 G/50ML
1 INJECTION, SOLUTION INTRAVENOUS ONCE
Status: COMPLETED | OUTPATIENT
Start: 2021-01-01 | End: 2021-01-01

## 2021-01-01 RX ORDER — AMLODIPINE BESYLATE 5 MG/1
TABLET ORAL
Qty: 90 TABLET | Refills: 3 | Status: SHIPPED | OUTPATIENT
Start: 2021-01-01

## 2021-01-01 RX ORDER — TRAMADOL HYDROCHLORIDE 50 MG/1
50 TABLET ORAL EVERY 12 HOURS PRN
Qty: 20 TABLET | Refills: 0 | Status: SHIPPED | OUTPATIENT
Start: 2021-01-01

## 2021-01-01 RX ORDER — FAMCICLOVIR 500 MG/1
500 TABLET ORAL 2 TIMES DAILY
Qty: 14 TABLET | Refills: 0 | Status: SHIPPED | OUTPATIENT
Start: 2021-01-01

## 2021-01-01 RX ADMIN — CEFTRIAXONE 1 G: 1 INJECTION, SOLUTION INTRAVENOUS at 23:25

## 2021-01-01 RX ADMIN — ACETAMINOPHEN 650 MG: 325 TABLET ORAL at 00:02

## 2021-01-01 RX ADMIN — IOPAMIDOL 100 ML: 612 INJECTION, SOLUTION INTRAVENOUS at 22:34

## 2021-01-01 RX ADMIN — SODIUM CHLORIDE 1000 ML: 9 INJECTION, SOLUTION INTRAVENOUS at 00:09

## 2021-01-01 RX ADMIN — DOXYCYCLINE 100 MG: 100 CAPSULE ORAL at 03:25

## 2021-05-05 NOTE — OUTREACH NOTE
Patient Outreach Note    RN-NIRAJ outreach attempt to patient.  Patient had an ED visit at [facility] 05/03/21.  Call answered this date by a female.  RN-ACM introduced self then answerer disconnected the call. Patient has a PCP appointment scheduled for tomorrow.  No further outreach attempts scheduled at this time.      Cassidy Fisher RN  Ambulatory     5/5/2021, 12:39 EDT

## 2021-05-06 NOTE — PROGRESS NOTES
"Pernell Nielson is a 87 y.o. male    HPI recent hospital visit to the emergency room for complaints of fatigue mild confusion and poor p.o. intake was evaluated with lab work showing evidence of hyponatremia mild dehydration a chest x-ray showed evidence of right lung infiltrate.  Placed empirically on doxycycline appears to be somewhat better now.  He is accompanied by his wife in person and by his daughter by phone seems to be doing somewhat better however did have some loose stools today.    The following portions of the patient's history were reviewed and updated as appropriate: allergies, current medications, past family history, past medical history, past social history, past surgical history, and problem list.     Review of Systems   Constitutional: Positive for fatigue. Negative for activity change, appetite change and fever.   HENT: Negative for congestion, ear discharge, ear pain and trouble swallowing.    Eyes: Negative for photophobia and visual disturbance.   Respiratory: Positive for cough. Negative for shortness of breath.    Cardiovascular: Negative for chest pain and palpitations.   Gastrointestinal: Negative for abdominal distention, abdominal pain, constipation, diarrhea, nausea and vomiting.   Endocrine: Negative.    Genitourinary: Negative for dysuria, hematuria and urgency.   Musculoskeletal: Positive for arthralgias and gait problem. Negative for back pain, joint swelling and myalgias.   Skin: Negative for color change and rash.   Allergic/Immunologic: Negative.    Neurological: Negative for dizziness, weakness, light-headedness and headaches.   Hematological: Negative for adenopathy. Does not bruise/bleed easily.   Psychiatric/Behavioral: Negative for agitation, confusion and dysphoric mood. The patient is not nervous/anxious.        Visit Vitals  /66   Pulse 65   Temp 98.6 °F (37 °C) (Infrared)   Ht 180.3 cm (71\")   Wt 57.6 kg (127 lb)   SpO2 97%   BMI 17.71 kg/m² "       Objective  Physical Exam  Constitutional:       General: He is not in acute distress.     Appearance: He is well-developed.   HENT:      Nose: Nose normal.   Eyes:      General: No scleral icterus.     Conjunctiva/sclera: Conjunctivae normal.   Neck:      Thyroid: No thyromegaly.      Trachea: No tracheal deviation.   Cardiovascular:      Rate and Rhythm: Normal rate and regular rhythm.      Heart sounds: No murmur heard.   No friction rub.   Pulmonary:      Effort: No respiratory distress.      Breath sounds: No wheezing or rales.   Abdominal:      General: There is no distension.      Palpations: Abdomen is soft. There is no mass.      Tenderness: There is no abdominal tenderness. There is no guarding.   Musculoskeletal:         General: Deformity present. Normal range of motion.   Lymphadenopathy:      Cervical: No cervical adenopathy.   Skin:     General: Skin is warm and dry.      Findings: No erythema or rash.   Neurological:      Mental Status: He is alert and oriented to person, place, and time.      Cranial Nerves: No cranial nerve deficit.      Coordination: Coordination normal.      Deep Tendon Reflexes: Reflexes are normal and symmetric.      Comments: Rt foot drop   Psychiatric:         Behavior: Behavior normal.         Thought Content: Thought content normal.         Judgment: Judgment normal.         Diagnoses and all orders for this visit:    Pneumonia of right middle lobe due to infectious organism appears to be responding well to oral antibiotic therapy encourage good oral intake if diarrhea persist will need further work-up or change in antibiotic.  Lung auscultation and O2 sat okay

## 2021-07-26 NOTE — PROGRESS NOTES
The ABCs of the Annual Wellness Visit  Subsequent Medicare Wellness Visit    Chief Complaint   Patient presents with   • Medicare Wellness-subsequent   • Earache     left ear - stuffy feeling comes and goes        Subjective   History of Present Illness:  Shane Nielson is a 87 y.o. male who presents for a Subsequent Medicare Wellness Visit.    HEALTH RISK ASSESSMENT    Recent Hospitalizations:  No hospitalization(s) within the last year.    Current Medical Providers:  Patient Care Team:  Vinayak Arenas MD as PCP - General (Internal Medicine)  Shakeel Saavedra MD as Consulting Physician (Cardiology)  Manasa Negrete MD as Consulting Physician (General Surgery)    Smoking Status:  Social History     Tobacco Use   Smoking Status Former Smoker   • Packs/day: 1.00   • Years: 27.00   • Pack years: 27.00   • Types: Cigarettes   • Quit date: 1981   • Years since quittin.1   Smokeless Tobacco Never Used       Alcohol Consumption:  Social History     Substance and Sexual Activity   Alcohol Use No       Depression Screen:   PHQ-2/PHQ-9 Depression Screening 2021   Little interest or pleasure in doing things 0   Feeling down, depressed, or hopeless 0   Total Score 0       Fall Risk Screen:  STEADI Fall Risk Assessment was completed, and patient is at HIGH risk for falls. Assessment completed on:2021    Health Habits and Functional and Cognitive Screening:  Functional & Cognitive Status 2021   Do you have difficulty preparing food and eating? Yes   Do you have difficulty bathing yourself, getting dressed or grooming yourself? Yes   Do you have difficulty using the toilet? No   Do you have difficulty moving around from place to place? Yes   Do you have trouble with steps or getting out of a bed or a chair? Yes   Current Diet Well Balanced Diet   Dental Exam Up to date        Dental Exam Comment -   Eye Exam Up to date        Eye Exam Comment -   Exercise (times per week) 0 times per week   Current  Exercises Include No Regular Exercise   Current Exercise Activities Include -   Do you need help using the phone?  No   Are you deaf or do you have serious difficulty hearing?  No   Do you need help with transportation? Yes   Do you need help shopping? Yes   Do you need help preparing meals?  Yes   Do you need help with housework?  Yes   Do you need help with laundry? Yes   Do you need help taking your medications? Yes   Do you need help managing money? Yes   Do you ever drive or ride in a car without wearing a seat belt? No   Have you felt unusual stress, anger or loneliness in the last month? No   Who do you live with? Spouse   If you need help, do you have trouble finding someone available to you? No   Have you been bothered in the last four weeks by sexual problems? No   Do you have difficulty concentrating, remembering or making decisions? No         Does the patient have evidence of cognitive impairment? No    Asprin use counseling:Does not need ASA (and currently is not on it)    Age-appropriate Screening Schedule:  Refer to the list below for future screening recommendations based on patient's age, sex and/or medical conditions. Orders for these recommended tests are listed in the plan section. The patient has been provided with a written plan.    Health Maintenance   Topic Date Due   • LIPID PANEL  07/01/2020   • ZOSTER VACCINE (2 of 2) 07/01/2029 (Originally 1/9/2012)   • INFLUENZA VACCINE  10/01/2021   • TDAP/TD VACCINES (2 - Td or Tdap) 05/17/2028          The following portions of the patient's history were reviewed and updated as appropriate: allergies, current medications, past family history, past medical history, past social history, past surgical history and problem list.    Outpatient Medications Prior to Visit   Medication Sig Dispense Refill   • amLODIPine (NORVASC) 5 MG tablet TAKE 1 TABLET DAILY 90 tablet 3   • carvedilol (COREG) 12.5 MG tablet TAKE 2 TABLETS TWICE A  tablet 3   •  doxycycline (MONODOX) 100 MG capsule Take 1 capsule by mouth 2 (Two) Times a Day. 14 capsule 0   • Fluocinonide Emulsified Base 0.05 % cream APPLY ONE TIME A DAY    TO AFFECTED AREA AS DIRECTED (Patient taking differently: As Needed.) 60 g 5   • montelukast (SINGULAIR) 10 MG tablet Take 1 tablet by mouth Daily. 90 tablet 3   • Probiotic Product (PROBIOTIC-10) capsule Take  by mouth Daily.       No facility-administered medications prior to visit.       Patient Active Problem List   Diagnosis   • Mixed hyperlipidemia   • Essential hypertension   • Malignant neoplasm of prostate (CMS/HCC)   • Coronary arteriosclerosis in native artery   • Arthritis   • Edema   • Right foot drop   • Laryngeal cancer (CMS/HCC)       Advanced Care Planning:  ACP discussion was held with the patient during this visit. Patient has an advance directive (not in EMR), copy requested.    Review of Systems   Constitutional: Negative.  Negative for activity change, appetite change, fatigue and fever.   HENT: Positive for ear pain. Negative for congestion, ear discharge and trouble swallowing.    Eyes: Negative for photophobia and visual disturbance.   Respiratory: Negative for cough and shortness of breath.    Cardiovascular: Negative for chest pain and palpitations.   Gastrointestinal: Negative for abdominal distention, abdominal pain, constipation, diarrhea, nausea and vomiting.   Endocrine: Negative.    Genitourinary: Negative for dysuria, hematuria and urgency.   Musculoskeletal: Positive for arthralgias and gait problem. Negative for back pain, joint swelling and myalgias.   Skin: Negative for color change and rash.   Allergic/Immunologic: Negative.    Neurological: Negative for dizziness, weakness, light-headedness and headaches.   Hematological: Negative for adenopathy. Does not bruise/bleed easily.   Psychiatric/Behavioral: Negative for agitation, confusion and dysphoric mood. The patient is not nervous/anxious.        Compared to one  "year ago, the patient feels his physical health is the same.  Compared to one year ago, the patient feels his mental health is the same.    Reviewed chart for potential of high risk medication in the elderly: yes  Reviewed chart for potential of harmful drug interactions in the elderly:yes    Objective         Vitals:    07/26/21 1429   BP: 120/60   Pulse: 55   Temp: 98.2 °F (36.8 °C)   TempSrc: Infrared   SpO2: 98%   Weight: 57.1 kg (125 lb 12.8 oz)   Height: 180.3 cm (71\")   PainSc: 0-No pain       Body mass index is 17.55 kg/m².  Discussed the patient's BMI with him. The BMI is below average; no BMI management plan is appropriate.    Physical Exam  Constitutional:       General: He is not in acute distress.     Appearance: He is well-developed.   HENT:      Right Ear: There is impacted cerumen.      Left Ear: There is impacted cerumen.      Nose: Nose normal.   Eyes:      General: No scleral icterus.     Conjunctiva/sclera: Conjunctivae normal.   Neck:      Thyroid: No thyromegaly.      Trachea: No tracheal deviation.   Cardiovascular:      Rate and Rhythm: Normal rate and regular rhythm.      Heart sounds: No murmur heard.   No friction rub.   Pulmonary:      Effort: No respiratory distress.      Breath sounds: No wheezing or rales.   Abdominal:      General: There is no distension.      Palpations: Abdomen is soft. There is no mass.      Tenderness: There is no abdominal tenderness. There is no guarding.   Musculoskeletal:         General: Deformity present. Normal range of motion.   Lymphadenopathy:      Cervical: No cervical adenopathy.   Skin:     General: Skin is warm and dry.      Findings: No erythema or rash.   Neurological:      Mental Status: He is alert and oriented to person, place, and time.      Cranial Nerves: No cranial nerve deficit.      Coordination: Coordination normal.      Deep Tendon Reflexes: Reflexes are normal and symmetric.   Psychiatric:         Behavior: Behavior normal.         " Thought Content: Thought content normal.         Judgment: Judgment normal.               Assessment/Plan   Medicare Risks and Personalized Health Plan  CMS Preventative Services Quick Reference  Advance Directive Discussion  Chronic Pain   Polypharmacy    The above risks/problems have been discussed with the patient.  Pertinent information has been shared with the patient in the After Visit Summary.  Follow up plans and orders are seen below in the Assessment/Plan Section.    Diagnoses and all orders for this visit:    1. Laryngeal cancer (CMS/HCC) (Primary) stable no new complaints following up with oncology    2. Malignant neoplasm of prostate (CMS/HCC) stable no new urinary symptoms    3. Essential hypertension stable with current meds and low-salt diet    4. Bilateral impacted cerumen requiring instrumentation to disimpact some of the large cerumen subsequently underwent ear canal irrigation with good results on the right side.  Has some persistent cerumen in the left side advised to continue with eardrops      Follow Up:  No follow-ups on file.     An After Visit Summary and PPPS were given to the patient.

## 2021-10-20 NOTE — PROGRESS NOTES
You have chosen to receive care through a telehealth visit.  Do you consent to use a video/audio connection for your medical care today? Yes    Individuals involved in this encounter:    HARITHA Aguilar Dr.

## 2021-10-20 NOTE — TELEPHONE ENCOUNTER
Caller: Padmaja Snyder    Relationship to patient: Emergency Contact    Best call back number: [phone]    Chief complaint: SHINGLES    Type of visit: SAME DAY/ VIRTUAL VISIT    Requested date:     If rescheduling, when is the original appointment:     Additional notes:

## 2021-10-20 NOTE — PROGRESS NOTES
Subjective   Shane Nielson is a 87 y.o. male.     Chief Complaint   Patient presents with   • Herpes Zoster     3 days; red, blistered; right lower back radiating to right flank       History of Present Illness video visit  Patient complains 3 days right flank to side of abdomen rash blistery erythematous distributed as band like rash.  Sensitive to touch no significant pain.  No fever no chills.  Denies any significant stress recently.  Patient had Shingrix x1.    Current Outpatient Medications:   •  amLODIPine (NORVASC) 5 MG tablet, TAKE 1 TABLET DAILY, Disp: 90 tablet, Rfl: 3  •  carvedilol (COREG) 12.5 MG tablet, TAKE 2 TABLETS TWICE A DAY, Disp: 360 tablet, Rfl: 3  •  montelukast (SINGULAIR) 10 MG tablet, Take 1 tablet by mouth Daily., Disp: 90 tablet, Rfl: 3  •  Probiotic Product (PROBIOTIC-10) capsule, Take  by mouth Daily., Disp: , Rfl:   •  doxycycline (MONODOX) 100 MG capsule, Take 1 capsule by mouth 2 (Two) Times a Day., Disp: 14 capsule, Rfl: 0  •  famciclovir (FAMVIR) 250 MG tablet, Take 2 tablets by mouth 3 (Three) Times a Day., Disp: 21 tablet, Rfl: 0  •  Fluocinonide Emulsified Base 0.05 % cream, APPLY ONE TIME A DAY    TO AFFECTED AREA AS DIRECTED (Patient taking differently: As Needed.), Disp: 60 g, Rfl: 5    The following portions of the patient's history were reviewed and updated as appropriate: allergies, current medications, past family history, past medical history, past social history, past surgical history and problem list.    Review of Systems   Constitutional: Negative.    Respiratory: Negative.    Cardiovascular: Negative.    Gastrointestinal: Negative.    Musculoskeletal: Negative.    Skin: Positive for rash.   Neurological: Negative.    Psychiatric/Behavioral: Negative.        Objective   Physical Exam  Skin:     Comments: Below is historical record only: Rash right side of the abdomen         All tests have been reviewed.    Assessment/Plan   Diagnoses and all orders for this  visit:    Herpes zoster without complication  -     famciclovir (FAMVIR) 250 MG tablet; Take 2 tablets by mouth 3 (Three) Times a Day.    Tylenol plenty of water and rest      Call if no better

## 2021-10-27 NOTE — PROGRESS NOTES
Televisit via Epic Video    Chief Complaint   Patient presents with   • Herpes Zoster     took all of meds-but now having some bleeding and oozing from rash spots-right flank/back       Subjective     History of Present Illness   Shane Nielson is a 87 y.o. male presenting for follow up via televisit for shingles.  He has now completed a course of acyclovir.  His family member who is a nurse was in the room and shared concerns about redness around his lesions on the right flank area along with losing from the areas where his rash was.  He is also coughing up productive sputum and seems to have been more lethargic and less interested in speaking.    Had significant weight loss but is now eating better.  Only got one of the shingle vaccines in the past.    The following portions of the patient's history were reviewed and updated as appropriate: allergies, current medications, past family history, past medical history, past social history, past surgical history and problem list.    Review of Systems   Constitutional: Negative for chills, fatigue and fever.   HENT: Negative for congestion, ear pain, rhinorrhea, sinus pressure and sore throat.    Eyes: Negative for visual disturbance.   Respiratory: Negative for cough, chest tightness, shortness of breath and wheezing.    Cardiovascular: Negative for chest pain, palpitations and leg swelling.   Gastrointestinal: Negative for abdominal pain, blood in stool, constipation, diarrhea, nausea and vomiting.   Endocrine: Negative for polydipsia and polyuria.   Genitourinary: Negative for dysuria and hematuria.   Musculoskeletal: Negative for arthralgias and back pain.   Skin: Negative for rash.   Neurological: Negative for dizziness, light-headedness, numbness and headaches.   Psychiatric/Behavioral: Negative for dysphoric mood and sleep disturbance. The patient is not nervous/anxious.        No Known Allergies    Past Medical History:   Diagnosis Date   • Allergic rhinitis    •  Arthritis    • Asthma     AS CHILD   • Edema    • Foot drop, right foot    • Hypercholesterolemia    • Hypertension    • Laryngeal cancer (HCC)     vocal cords   • Prostate cancer (HCC)        Social History     Socioeconomic History   • Marital status:    Tobacco Use   • Smoking status: Former Smoker     Packs/day: 1.00     Years: 27.00     Pack years: 27.00     Types: Cigarettes     Quit date: 1981     Years since quittin.4   • Smokeless tobacco: Never Used   Substance and Sexual Activity   • Alcohol use: No   • Drug use: No   • Sexual activity: Defer        Past Surgical History:   Procedure Laterality Date   • CARDIAC CATHETERIZATION      Dr. Mariusz Blanc at [facility] 70% EF  mild to moderate left main disease,mild proximal LAD disease. Medical Therapy recommended   • CATARACT EXTRACTION     • CIRCUMCISION      done at 32 years old   • EYE SURGERY Right      tear in the right/Repair Of Retinal Detachment   • HERNIA REPAIR      Inguinal Sliding- Dr. Negrete   • PROSTATE SURGERY       cancer w/ radiation   • THROAT SURGERY      vocal cord polyp removed 2x's  cancer w/ radiation  Dr. Olivares   • TONSILLECTOMY      With Adenoidectomy       Family History   Problem Relation Age of Onset   • Stroke Mother    • Cancer Father          Current Outpatient Medications:   •  amLODIPine (NORVASC) 5 MG tablet, TAKE 1 TABLET DAILY, Disp: 90 tablet, Rfl: 3  •  carvedilol (COREG) 12.5 MG tablet, TAKE 2 TABLETS TWICE A DAY, Disp: 360 tablet, Rfl: 3  •  Fluocinonide Emulsified Base 0.05 % cream, APPLY ONE TIME A DAY    TO AFFECTED AREA AS DIRECTED (Patient taking differently: As Needed.), Disp: 60 g, Rfl: 5  •  montelukast (SINGULAIR) 10 MG tablet, Take 1 tablet by mouth Daily., Disp: 90 tablet, Rfl: 3  •  Probiotic Product (PROBIOTIC-10) capsule, Take  by mouth Daily., Disp: , Rfl:   •  cephalexin (Keflex) 500 MG capsule, Take 1 capsule by mouth 2 (Two) Times a Day., Disp: 14 capsule, Rfl: 0    Objective   Temp 100.1 °F  "(37.8 °C)   Ht 180.3 cm (71\")   Wt 56.2 kg (124 lb)   BMI 17.29 kg/m²     Physical Exam  Vitals and nursing note reviewed.   Constitutional:       Appearance: Normal appearance. He is well-developed.   HENT:      Head: Normocephalic and atraumatic.   Eyes:      Extraocular Movements: Extraocular movements intact.      Conjunctiva/sclera: Conjunctivae normal.   Pulmonary:      Effort: Pulmonary effort is normal.   Musculoskeletal:      Cervical back: Normal range of motion and neck supple.   Skin:     General: Skin is warm and dry.      Findings: No rash.   Neurological:      General: No focal deficit present.      Mental Status: He is alert and oriented to person, place, and time.   Psychiatric:         Mood and Affect: Mood normal.         Behavior: Behavior normal.       Limited Physical exam due to video visit    Assessment/Plan   Diagnoses and all orders for this visit:    1. Metabolic encephalopathy (Primary)  -     CBC & Differential  -     Sedimentation Rate  -     Comprehensive Metabolic Panel  -     Ambulatory Referral to Home Health    2. Impetigo  -     cephalexin (Keflex) 500 MG capsule; Take 1 capsule by mouth 2 (Two) Times a Day.  Dispense: 14 capsule; Refill: 0  -     CBC & Differential  -     Sedimentation Rate  -     Comprehensive Metabolic Panel  -     Ambulatory Referral to Home Health    3. Fever, unspecified fever cause    4. Disseminated herpes zoster  -     Ambulatory Referral to Home Health          Discussion Summary:  Patient is a 87 y.o. male presenting for follow up.    Metabolic encephalopathy with shingles and impetigo/pneumonia  -Likely a result of shingles however some of his behavior changes which are difficult to assess via telemedicine are concerning for possibly even stroke or infection with pneumonia or impetigo.  We will empirically try antibiotics for coverage of impetigo with Keflex which can also cover for some types of pneumonia.  -Family was advised to take the patient " to the emergency room should his mental status not improve.    Follow up:  No follow-ups on file.

## 2021-10-29 NOTE — TELEPHONE ENCOUNTER
JOSE JUAN WITH [facility] CALLED ALSO NEEDING TO KNOW IF IT IS OK IF THEY DO LABS ON PATIENT NEXT WEEK BETWEEN Monday - Wednesday     PLEASE ADVISE [phone]

## 2021-11-02 NOTE — TELEPHONE ENCOUNTER
Caller: Padmaja Snyder    Relationship: Emergency Contact    Best call back number: [phone]         Who are you requesting to speak with (clinical staff, provider,  specific staff member): DR REEVES        What was the call regarding: PADMAJA CALLED STATING THAT THE PATIENT IS IN A LOT OF PAIN.  PADMAJA STATED THAT THEY HAVE BEEN GIVING HIM TYLENOL.  SHE ASKED IF THERE IS SOMETHING TO HELP WITH THE PAIN.      Do you require a callback: YES

## 2021-11-02 NOTE — TELEPHONE ENCOUNTER
I spoke with Padmaja - she says Shane is in a lot of pain and is giving up. Tylenol is not helping, Padmaja is requesting something to help ease his pain and help him rest. Please advise

## 2021-11-09 NOTE — HOME HEALTH
stage 2 to sacrum has yellow slough in center. pt continues to sit in chair and has limited ambulation. Patient is agreeable to physical therapy evaluation. New order written to add medihoney for wound care. caregiver able to perform when sn not in home.

## 2021-11-10 NOTE — TELEPHONE ENCOUNTER
Patients daughter states that he has a productive cough and wanted to know if you wanted a sample or what she needed to do.  Please advise.  Phone number verified.

## 2021-11-11 NOTE — TELEPHONE ENCOUNTER
Spoke with daughter and she states that Shane just finished a antibiotic but is still having green mucus when coughing. I offered a appointment for the patient and daughter stated Gene did not want to come out. Daughter is wanting to know if there is anything else that can be prescribed for this or any testing that can be done?

## 2021-11-11 NOTE — HOME HEALTH
Pt is an 87 year old male who lives with his wife; pt's daughters are also currently staying with them, both of which are nurses. Pt is recovery from recent Shingles and resp. infection, per daughters' report. They explain he has been unable to walk for approx. 2 years and is fully dependent on wife and daughters for mobility and ADLs. x2 assist for transfers to w/c, toilet, bed, etc. Daughters explain pt has recently been more confused.    During evaluation, pt able to perform STS with max a x2; unable to come to fully erect position and only able to maintain standing with assist for grossly 10-15 sec. Pt with BLE MMT grossly 3+/5. Educated pt and family on simple seated and supine LE ther ex for ROM and strength.     Pt would benefit from hospital bed for increased safety, improved positioning (pt with pressure sore on coccyx) and decreased caregiver burden; will call PCP to discuss hospital bed order.

## 2021-11-12 NOTE — TELEPHONE ENCOUNTER
I spoke with Felicita at home health she gave me the number for ashtyn at [facility] ([phone])    Ashtyn is going to get in touch with home health to follow up on the order for a hospital bed.     Shane has a referral to Hospice - if he is admitted with Hospice they will provide a hospital bed for him    Ashtyn will keep me posted if anything is needed from Dr. Arenas

## 2021-11-12 NOTE — TELEPHONE ENCOUNTER
Caller: ISAAC     Relationship: [facility]    Best call back number: [phone]    What orders are you requesting (i.e. lab or imaging): ORDER FOR A HOSPITAL BED    In what timeframe would the patient need to come in: ASAP    Where will you receive your lab/imaging services: SEND TO [facility]    Additional notes:

## 2021-11-15 NOTE — TELEPHONE ENCOUNTER
Caller: Padmaja Snyder    Relationship: Emergency Contact    Best call back number:[phone]    What is the best time to reach you: ASAP    Who are you requesting to speak with (clinical staff, provider,  specific staff member): DR REEVES    Do you know the name of the person who called: NA    What was the call regarding: PATIENT WAS DISCHARGED FROM ER 11/14. PATIENTS DAUGHTER STATES SHE WOULD LIKE TO SPEAK W ITH DR REEVES AS SHE HAS QUESTIONS ABOUT THE PATIENTS MEDICATIONS    Do you require a callback:YES

## 2021-11-15 NOTE — ED PROVIDER NOTES
Subjective   87-year-old male presenting with altered mental status and weakness.  He is brought in via EMS with his wife who provides most of the history.  She states over the last few days he has been increasingly weak, poor appetite.  He has had episodes often of what she describes as stroke symptoms where he will not move his right hand and seems confused.  He has had multiple of these episodes today.  They have seen her primary doctor for this.  Per report when EMS arrived patient was found to be hypotensive.  He denies any fevers, chills, chest pain, shortness of breath, abdominal pain, vomiting or diarrhea.  Wife does note that he is currently being treated for shingles as well as a decubitus ulcer to his sacrum.          Review of Systems   Constitutional: Negative for fever.   HENT: Negative.    Eyes: Negative.    Respiratory: Negative.    Cardiovascular: Negative.    Gastrointestinal: Negative.    Genitourinary: Negative.    Musculoskeletal: Negative.    Skin: Negative.    Neurological: Positive for weakness. Negative for headaches.   Psychiatric/Behavioral: Positive for confusion.       Past Medical History:   Diagnosis Date   • Allergic rhinitis    • Arthritis    • Asthma     AS CHILD   • Edema    • Foot drop, right foot    • Hypercholesterolemia    • Hypertension    • Laryngeal cancer (HCC)     vocal cords   • Prostate cancer (HCC)        No Known Allergies    Past Surgical History:   Procedure Laterality Date   • CARDIAC CATHETERIZATION      Dr. Mariusz Blanc at PAC 70% EF  mild to moderate left main disease,mild proximal LAD disease. Medical Therapy recommended   • CATARACT EXTRACTION     • CIRCUMCISION      done at 32 years old   • EYE SURGERY Right      tear in the right/Repair Of Retinal Detachment   • HERNIA REPAIR      Inguinal Sliding- Dr. Negrete   • PROSTATE SURGERY       cancer w/ radiation   • THROAT SURGERY      vocal cord polyp removed 2x's  cancer w/ radiation  Dr. Olivares   • TONSILLECTOMY       With Adenoidectomy       Family History   Problem Relation Age of Onset   • Stroke Mother    • Cancer Father        Social History     Socioeconomic History   • Marital status:    Tobacco Use   • Smoking status: Former Smoker     Packs/day: 1.00     Years: 27.00     Pack years: 27.00     Types: Cigarettes     Quit date: 1981     Years since quittin.4   • Smokeless tobacco: Never Used   Substance and Sexual Activity   • Alcohol use: No   • Drug use: No   • Sexual activity: Defer           Objective   Physical Exam  Vitals reviewed.   Constitutional:       General: He is not in acute distress.     Appearance: He is not toxic-appearing or diaphoretic.      Comments: Chronically ill-appearing, frail   HENT:      Head: Normocephalic and atraumatic.      Right Ear: External ear normal.      Left Ear: External ear normal.      Nose: Nose normal.      Mouth/Throat:      Pharynx: Oropharynx is clear.      Comments: Dry mucous membranes and lips  Eyes:      Extraocular Movements: Extraocular movements intact.      Conjunctiva/sclera: Conjunctivae normal.      Pupils: Pupils are equal, round, and reactive to light.   Cardiovascular:      Rate and Rhythm: Normal rate and regular rhythm.      Pulses: Normal pulses.      Heart sounds: Normal heart sounds.   Pulmonary:      Effort: Pulmonary effort is normal. No respiratory distress.      Breath sounds: Normal breath sounds.   Abdominal:      General: Bowel sounds are normal. There is no distension.      Tenderness: There is no abdominal tenderness.   Musculoskeletal:         General: No swelling, tenderness or deformity. Normal range of motion.      Cervical back: Normal range of motion and neck supple.   Skin:     General: Skin is warm and dry.      Capillary Refill: Capillary refill takes less than 2 seconds.      Findings: No rash.   Neurological:      General: No focal deficit present.      Mental Status: He is alert.      Comments: Oriented to person,  place and situation, moves all extremities with normal strength, sensation intact, cranial nerves intact   Psychiatric:         Mood and Affect: Mood normal.         Behavior: Behavior normal.         Procedures           ED Course                                           MDM  Number of Diagnoses or Management Options  Episodic confusion  Hypotension, unspecified hypotension type  Lactic acidosis  Pneumonia of right lung due to infectious organism, unspecified part of lung  Diagnosis management comments: 87-year-old male with general weakness, apparent confusion.  Chronically ill-appearing frail man in no distress with exam as above.  His blood pressure is low normal here.  His exam is otherwise fairly nonfocal.  Will obtain labs, urinalysis, chest x-ray and head CT.  Will give IV fluids.  Disposition pending.    DDx: Dehydration, electrolyrte abnormality, UTI, pneumonia, ICH, CVA    EKG interpreted by me: Sinus rhythm, rate 60, LAFB, some nonspecific T waves, no acute ST changes, this is an abnormal EKG    22:15 EST Blood work thus far notable for mild lactic acidosis.  Chest x-ray reveals right midlung opacity, could be pneumonia versus malignancy, CT scan recommended.  Still awaiting urinalysis.  Have ordered CT scan in addition to antibiotics.  Disposition pending.    00:42 EST CT scan reveals the area of consolidation, still unclear etiology.  Patient's blood pressure has remained stable.  He feels improved, I have recommended admission given his age, other comorbidities and the findings on the CT scan.  Patient is adamant that he wants to go home.  Patient and family are currently in talks with the primary doctor about palliative care.  Long discussion with patient and family, we are all in agreement that patient would likely be better suited at home.  We discussed strict return precautions.  Patient is very happy with this plan.       Amount and/or Complexity of Data Reviewed  Decide to obtain previous  medical records or to obtain history from someone other than the patient: yes        Final diagnoses:   Pneumonia of right lung due to infectious organism, unspecified part of lung   Hypotension, unspecified hypotension type   Episodic confusion   Lactic acidosis          Deangelo Mercedes MD  11/15/21 0044

## 2021-11-15 NOTE — TELEPHONE ENCOUNTER
Stefany with [facility] left vm on referral line that she needs a call back on referral they received on patient. Call her at [phone]

## 2021-11-16 NOTE — HOME HEALTH
"patient had e.r visit only, 11.14.21. Patient with diagnosis of pna. Placed on new antibiotic. per daughter a \"mass\" was found on xray of lungs. Patient does not want any futher treatments to find out what \"mass\" is and does not want to see a pulmonary physician. patient called all his children to his home as he \" felt he was dying\".  Son came in from [place].  This RN called [facility] 11.15.21 and made a hospice referral. per Violet at hospice , pt should be seen tomorrow. called casandra Merrill, and informed of pending hospice visit. Per daughter, Patient's blood pressure was extremely low in the e.r. Patient was given IVF'S and IV ABX."

## 2021-12-21 ENCOUNTER — TELEPHONE (OUTPATIENT)
Dept: INTERNAL MEDICINE | Facility: CLINIC | Age: 86
End: 2021-12-21

## 2022-05-11 NOTE — ED PROVIDER NOTES
Subjective   84-year-old male tried to sit in his rocker chair, lost his footing and fell backwards hitting his head on a metal space heater on the ground just prior to arrival.  No loss of consciousness.  Has a occipital scalp laceration and a mild headache.  No neck pain or vomiting.  No other injuries.  Last tetanus unknown.  His only blood thinner is aspirin.    Aggravating factors: Palpation of the wound.  Alleviating factors: Applied pressure.  Treatment prior to arrival: Applied pressure.        History provided by:  Patient and spouse  History limited by: nothing.   used: No        Review of Systems   Constitutional: Negative.    Eyes: Negative.    Respiratory: Negative.    Cardiovascular: Negative.  Negative for chest pain.   Gastrointestinal: Negative.  Negative for abdominal pain.   Endocrine: Negative.    Genitourinary: Negative for dysuria.   Musculoskeletal: Negative.    Skin: Positive for wound.   Allergic/Immunologic: Negative.    Neurological: Positive for headaches.   Hematological: Negative.    Psychiatric/Behavioral: Negative.    All other systems reviewed and are negative.      Past Medical History:   Diagnosis Date   • Allergic rhinitis    • Arthritis    • Asthma     AS CHILD   • Edema    • Foot drop, right foot    • Hypercholesterolemia    • Hypertension    • Laryngeal cancer     vocal cords   • Localized cancer of vocal cords    • Prostate cancer        Allergies   Allergen Reactions   • Grass        Past Surgical History:   Procedure Laterality Date   • CARDIAC CATHETERIZATION      Dr. Mariusz Blanc at PAC 70% EF  mild to moderate left main disease,mild proximal LAD disease. Medical Therapy recommended   • CATARACT EXTRACTION     • CIRCUMCISION      done at 32 years old   • EYE SURGERY Right      tear in the right/Repair Of Retinal Detachment   • HERNIA REPAIR      Inguinal Sliding- Dr. Negrete   • PROSTATE SURGERY       cancer w/ radiation   • THROAT SURGERY      vocal cord  polyp removed 2x's  cancer w/ radiation  Dr. Olivares   • TONSILLECTOMY      With Adenoidectomy       Family History   Problem Relation Age of Onset   • Stroke Mother    • Cancer Father        Social History     Social History   • Marital status:      Social History Main Topics   • Smoking status: Former Smoker     Quit date: 6/13/1981   • Smokeless tobacco: Never Used   • Alcohol use No   • Drug use: No     Other Topics Concern   • Not on file           Objective   Physical Exam   Constitutional: He is oriented to person, place, and time. He appears well-developed and well-nourished. No distress.   HENT:   Head: Normocephalic and atraumatic.   Right Ear: External ear normal.   Left Ear: External ear normal.   Eyes: EOM are normal. Pupils are equal, round, and reactive to light.   Neck: Normal range of motion. Neck supple.   Neck is nontender in the midline.   Cardiovascular: Normal rate, regular rhythm and normal heart sounds.    Pulmonary/Chest: Effort normal and breath sounds normal. No stridor. He has no wheezes. He exhibits no tenderness.   Abdominal: Soft. He exhibits no distension. There is no tenderness. There is no guarding.   Musculoskeletal: Normal range of motion. He exhibits no edema.   The middle and lower back are nontender to palpation.  Extremities are nontender to palpation including the hips.   Neurological: He is alert and oriented to person, place, and time. He displays normal reflexes. No cranial nerve deficit or sensory deficit.   Skin: Skin is warm and dry. No rash noted. He is not diaphoretic.   Patient has a 4 cm occipital scalp laceration.  No fat exposed.   Psychiatric: He has a normal mood and affect. His behavior is normal. Judgment and thought content normal.   Nursing note and vitals reviewed.      Laceration Repair  Date/Time: 5/17/2018 3:12 PM  Performed by: GEOVANNY ROJO  Authorized by: NERY TOWNSEND     Consent:     Consent obtained:  Verbal    Consent given by:   Patient    Risks discussed:  Infection  Anesthesia (see MAR for exact dosages):     Anesthesia method:  Local infiltration    Local anesthetic:  Lidocaine 1% WITH epi  Laceration details:     Location:  Scalp    Scalp location:  Occipital    Length (cm):  4  Repair type:     Repair type:  Simple  Pre-procedure details:     Preparation:  Patient was prepped and draped in usual sterile fashion  Exploration:     Hemostasis achieved with:  Epinephrine    Contaminated: no    Treatment:     Area cleansed with:  Saline and Hibiclens    Amount of cleaning:  Extensive    Irrigation solution:  Sterile saline    Irrigation method:  Syringe    Foreign body removal: no fb.    Skin repair:     Repair method:  Staples    Number of staples:  8  Approximation:     Approximation:  Close  Post-procedure details:     Dressing:  Open (no dressing)    Patient tolerance of procedure:  Tolerated well, no immediate complications               ED Course                  MDM  Number of Diagnoses or Management Options  Accidental fall, initial encounter: new and requires workup  Acute head injury without loss of consciousness, initial encounter: new and requires workup  Acute maxillary sinusitis, recurrence not specified: new and requires workup  Scalp laceration, initial encounter: new and requires workup     Amount and/or Complexity of Data Reviewed  Tests in the radiology section of CPT®: ordered and reviewed    Risk of Complications, Morbidity, and/or Mortality  Presenting problems: moderate  Diagnostic procedures: low  Management options: moderate    Patient Progress  Patient progress: stable        Final diagnoses:   Accidental fall, initial encounter   Acute head injury without loss of consciousness, initial encounter   Scalp laceration, initial encounter   Acute maxillary sinusitis, recurrence not specified            Jh Hearn PA-C  05/17/18 1517     severe

## 2023-05-18 NOTE — PROGRESS NOTES
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 CAD  1. 1999 LHC nonobst  2. HTN  3. HL   1. 2016 LDL 24  2. 7/1/2019 LDL 74  4. 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 XRT 2000  6. R foot drop- brace ([place])  7. Prostate CA XRT ([place])  8. L forearm and shoulder abscess I&D Pen[facility]    Chief Complaint   Patient presents with   • Coronary Artery Disease       Allergies  No Known Allergies    Current Medications    Current Outpatient Medications:   •  amLODIPine (NORVASC) 5 MG tablet, TAKE 1 TABLET DAILY, Disp: 90 tablet, Rfl: 4  •  carvedilol (COREG) 12.5 MG tablet, Take 2 tablets by mouth 2 (Two) Times a Day., Disp: 120 tablet, Rfl: 0  •  Fluocinonide Emulsified Base 0.05 % cream, APPLY ONE TIME A DAY    TO AFFECTED AREA AS DIRECTED (Patient taking differently: As Needed.), Disp: 60 g, Rfl: 5  •  montelukast (SINGULAIR) 10 MG tablet, TAKE 1 TABLET DAILY, Disp: 90 tablet, Rfl: 4  •  Probiotic Product (PROBIOTIC-10) capsule, Take  by mouth Daily., Disp: , Rfl:       History of Present Illness   Shane Nielson is a 86 y.o. year old male here for follow up.  He unfortunately has not been out of the home only one other time since the pandemic began.  He is drinking boost and attempt to keep his weight maintained.  He has no provocative chest symptoms        OBJECTIVE:  Vitals:    09/16/20 1035   BP: 110/58   BP Location: Left arm   Patient Position: Sitting   Pulse: 60   Temp: 98 °F (36.7 °C)   SpO2: 98%   Weight: 61.2 kg (135 lb)   Height: 180.3 cm (71\")     Physical Exam   Constitutional: He is oriented to person, place, and time. He appears well-developed and well-nourished. No distress.   Cardiovascular: Normal rate, regular rhythm, normal heart sounds and intact distal pulses.   No murmur " heard.  Pulses:       Radial pulses are 2+ on the right side and 2+ on the left side.        Dorsalis pedis pulses are 2+ on the right side and 2+ on the left side.        Posterior tibial pulses are 2+ on the right side and 2+ on the left side.   Pulmonary/Chest: Effort normal and breath sounds normal. He has no wheezes. He has no rales.   Abdominal: Soft. Bowel sounds are normal. There is no abdominal tenderness. There is no guarding.   Musculoskeletal:         General: Edema present. No tenderness.      Comments: In wheelchair   Neurological: He is alert and oriented to person, place, and time.   Skin: Skin is warm and dry. No rash noted.   Psychiatric: He has a normal mood and affect.   Nursing note and vitals reviewed.      Diagnostic Data:  Procedures      ASSESSMENT:   Diagnosis Plan   1. Chest pain on breathing     2. Essential hypertension         PLAN:  1. Patient is stable with no anginal equivalent symptoms. Continue medical management.   2. Patient has acceptable BP. Continue current medical management. Counseled to regularly check BP at home with goal averaging <130/80.   3. Diet controlled, labs per pcp  Weight loss-improved with supplemental Vinayak Merchant MD, thank you for referring Mr. Nielson for evaluation.  I have forwarded my electronically generated recommendations to you for review.  Please do not hesitate to call with any questions.      9/16/2020  10:47 EDT     Yes

## 2023-07-03 NOTE — PROGRESS NOTES
Shane Nielson is a 84 y.o. male.     Subjective   History of Present Illness   Fell 1 week ago at home when he tripped over the leg of a rocking chair.  He struck the back of his head on the edge of an object and sustained scalp laceration in the occipital area. He was taken to [facility] ED by his family where he received 8 staples. He denies any headaches, confusion, dizziness, abnormal fatigue, nausea or vision changes. Head CT was performed which revealed incidental finding of advanced bilateral maxillary sinusitis. He denies sinus pressure, sinus pain, congestion, rhinorrhea or headaches. He reports that he had some sort of sinus surgery many years ago which may be why he is asymptomatic. He was prescribed 2 weeks of Amoxicillin from the ED which he is taking as directed.         The following portions of the patient's history were reviewed and updated as appropriate: allergies, current medications, past family history, past medical history, past social history, past surgical history and problem list.    Review of Systems    Constitutional: Negative for appetite change, chills, fatigue, fever and unexpected weight change.   HENT: Negative for congestion, ear pain, hearing loss, nosebleeds, postnasal drip, rhinorrhea, sore throat, tinnitus and trouble swallowing.    Eyes: Negative for photophobia, discharge and visual disturbance.   Respiratory: Negative for cough, chest tightness, shortness of breath and wheezing.    Cardiovascular: Negative for chest pain, palpitations and leg swelling.   Gastrointestinal: Negative for abdominal distention, abdominal pain, blood in stool, constipation, diarrhea, nausea and vomiting.   Endocrine: Negative for cold intolerance, heat intolerance, polydipsia, polyphagia and polyuria.   Musculoskeletal: Negative for arthralgias, back pain, joint swelling, myalgias, neck pain and neck stiffness.   Skin: scalp laceration.  Negative for color change, pallor, rash.  Allergic/Immunologic:  Negative for environmental allergies, food allergies and immunocompromised state.   Neurological: Negative for dizziness, tremors, seizures, weakness, numbness and headaches.   Hematological: Negative for adenopathy. Does not bruise/bleed easily.   Psychiatric/Behavioral: Negative for sleep disturbances, agitation, behavioral problems, confusion, hallucinations, self-injury and suicidal ideas. The patient is not nervous/anxious.      Objective    Physical Exam   HENT:   Head:         Constitutional: No acute distress. Appears well-developed and well-nourished.   Head: Scalp laceration as noted above. Normocephalic. No sinus tenderness.   Eyes: EOM are normal. Pupils are equal, round, and reactive to light.   Neck: Normal range of motion. Neck supple.   Cardiovascular: Normal rate, regular rhythm and normal heart sounds.    Pulmonary/Chest: Effort normal and breath sounds normal. No respiratory distress.  Has no wheezes or rales. Exhibits no chest wall tenderness.   Abdominal: Soft. Bowel sounds are normal. Exhibits no distension and no mass. There is no tenderness.   Musculoskeletal: Normal range of motion. Exhibits no tenderness.   Neurological: Alert and oriented to person, place, and time.   Skin: Skin is warm and dry.   Psychiatric: Has a normal mood and affect. Behavior is normal. Judgment and thought content normal.     Suture Removal  Date/Time: 5/29/2018 7:16 AM  Performed by: JOSE JUAN CALVERT  Authorized by: JOSE JUAN CALVERT   Consent: Verbal consent obtained. Written consent obtained.  Risks and benefits: risks, benefits and alternatives were discussed  Consent given by: patient and spouse  Patient understanding: patient states understanding of the procedure being performed  Patient consent: the patient's understanding of the procedure matches consent given  Procedure consent: procedure consent matches procedure scheduled  Relevant documents: relevant documents present and verified  Imaging  "studies: imaging studies available  Required items: required blood products, implants, devices, and special equipment available  Patient identity confirmed: verbally with patient  Time out: Immediately prior to procedure a \"time out\" was called to verify the correct patient, procedure, equipment, support staff and site/side marked as required.  Body area: head/neck  Location details: scalp  Wound Appearance: clean  Staples Removed: 8  Post-removal: antibiotic ointment applied  Patient tolerance: Patient tolerated the procedure well with no immediate complications  Comments: Stapled placed at [facility] ED.            /80   Pulse 69   Temp 98.5 °F (36.9 °C)   Ht 180.3 cm (70.98\")   Wt 64 kg (141 lb)   SpO2 98%   BMI 19.67 kg/m²     Nursing note and vitals reviewed.          Assessment/Plan   Gene was seen today for follow-up.    Diagnoses and all orders for this visit:    Encounter for staple removal  Occipital scalp laceration, subsequent encounter  Removal of 8 staples from scalp without complication. Wound was well healed.  Patient tolerated well.   -     Suture Removal    Maxillary sinusitis, unspecified chronicity  CT scan from [facility] ED reviewed. Advised to cotninue amoxicillin as prescribed unless he develops undesirable side effects such as diarrhea.      ED record reviewed.       Call or RTC if symptoms worsen or persist.          " 61.2

## 2024-09-26 NOTE — TELEPHONE ENCOUNTER
See patient live well message below.  Since patient has normal stress test do you still want him to consult with Cardiololgist?  Please advise.   lov 01/2020

## 2024-12-23 NOTE — TELEPHONE ENCOUNTER
"Last seen 11/7/24  Medication pended  [facility] CONTRACT 2/20/24    Festus Menjivar \"Nadia\"  You5 minutes ago (10:10 AM)       Hi, I copied and pasted it below, it’s the generic for Advair     fluticasone propion-salmeteroL 250-50 mcg/dose diskus inhaler       You  Festus Menjivar \"Nadia\"2 hours ago (7:47 AM)       Tiara Zuniga,     I see Diazepam but what is the other medication that you need sent in?     Thank you       Festus Menjivar \"Nadia\"  P Do [id number] Clinical Support Staff (supporting Uvaldo Hancock MD)18 hours ago (3:34 PM)       Good morning.  Can I please get a refill on these 2 medications?     Thank you.  " Spoke with Jesenia about labs.